# Patient Record
Sex: MALE | Race: ASIAN | NOT HISPANIC OR LATINO | Employment: FULL TIME | URBAN - METROPOLITAN AREA
[De-identification: names, ages, dates, MRNs, and addresses within clinical notes are randomized per-mention and may not be internally consistent; named-entity substitution may affect disease eponyms.]

---

## 2019-04-19 ENCOUNTER — HOSPITAL ENCOUNTER (INPATIENT)
Facility: CLINIC | Age: 49
LOS: 1 days | Discharge: HOME | End: 2019-04-20
Attending: EMERGENCY MEDICINE | Admitting: STUDENT IN AN ORGANIZED HEALTH CARE EDUCATION/TRAINING PROGRAM
Payer: COMMERCIAL

## 2019-04-19 ENCOUNTER — APPOINTMENT (OUTPATIENT)
Dept: CT IMAGING | Facility: CLINIC | Age: 49
End: 2019-04-19
Attending: EMERGENCY MEDICINE
Payer: COMMERCIAL

## 2019-04-19 ENCOUNTER — OFFICE VISIT (OUTPATIENT)
Dept: URGENT CARE | Facility: URGENT CARE | Age: 49
End: 2019-04-19
Payer: COMMERCIAL

## 2019-04-19 VITALS
HEART RATE: 84 BPM | OXYGEN SATURATION: 96 % | TEMPERATURE: 97 F | RESPIRATION RATE: 20 BRPM | DIASTOLIC BLOOD PRESSURE: 83 MMHG | SYSTOLIC BLOOD PRESSURE: 131 MMHG

## 2019-04-19 DIAGNOSIS — R47.81 SLURRED SPEECH: ICD-10-CM

## 2019-04-19 DIAGNOSIS — E78.5 HYPERLIPIDEMIA LDL GOAL <130: Primary | ICD-10-CM

## 2019-04-19 DIAGNOSIS — R20.0 LEFT ARM NUMBNESS: Primary | ICD-10-CM

## 2019-04-19 DIAGNOSIS — F17.200 TOBACCO USE DISORDER: ICD-10-CM

## 2019-04-19 DIAGNOSIS — I63.9 CEREBROVASCULAR ACCIDENT (CVA), UNSPECIFIED MECHANISM (H): ICD-10-CM

## 2019-04-19 LAB
ANION GAP SERPL CALCULATED.3IONS-SCNC: 9 MMOL/L (ref 3–14)
APTT PPP: 35 SEC (ref 22–37)
BASOPHILS # BLD AUTO: 0 10E9/L (ref 0–0.2)
BASOPHILS NFR BLD AUTO: 0.2 %
BUN SERPL-MCNC: 18 MG/DL (ref 7–30)
CALCIUM SERPL-MCNC: 8.9 MG/DL (ref 8.5–10.1)
CHLORIDE SERPL-SCNC: 111 MMOL/L (ref 94–109)
CO2 SERPL-SCNC: 22 MMOL/L (ref 20–32)
CREAT SERPL-MCNC: 0.76 MG/DL (ref 0.66–1.25)
DIFFERENTIAL METHOD BLD: NORMAL
EOSINOPHIL # BLD AUTO: 0.1 10E9/L (ref 0–0.7)
EOSINOPHIL NFR BLD AUTO: 1.5 %
ERYTHROCYTE [DISTWIDTH] IN BLOOD BY AUTOMATED COUNT: 12.5 % (ref 10–15)
GFR SERPL CREATININE-BSD FRML MDRD: >90 ML/MIN/{1.73_M2}
GLUCOSE BLDC GLUCOMTR-MCNC: 99 MG/DL (ref 70–99)
GLUCOSE SERPL-MCNC: 117 MG/DL (ref 70–99)
HCT VFR BLD AUTO: 41.3 % (ref 40–53)
HGB BLD-MCNC: 14.2 G/DL (ref 13.3–17.7)
IMM GRANULOCYTES # BLD: 0 10E9/L (ref 0–0.4)
IMM GRANULOCYTES NFR BLD: 0.3 %
INR PPP: 0.97 (ref 0.86–1.14)
INTERPRETATION ECG - MUSE: NORMAL
LYMPHOCYTES # BLD AUTO: 2 10E9/L (ref 0.8–5.3)
LYMPHOCYTES NFR BLD AUTO: 22.4 %
MCH RBC QN AUTO: 30.8 PG (ref 26.5–33)
MCHC RBC AUTO-ENTMCNC: 34.4 G/DL (ref 31.5–36.5)
MCV RBC AUTO: 90 FL (ref 78–100)
MONOCYTES # BLD AUTO: 0.5 10E9/L (ref 0–1.3)
MONOCYTES NFR BLD AUTO: 6 %
NEUTROPHILS # BLD AUTO: 6.1 10E9/L (ref 1.6–8.3)
NEUTROPHILS NFR BLD AUTO: 69.6 %
NRBC # BLD AUTO: 0 10*3/UL
NRBC BLD AUTO-RTO: 0 /100
PLATELET # BLD AUTO: 229 10E9/L (ref 150–450)
POTASSIUM SERPL-SCNC: 3.8 MMOL/L (ref 3.4–5.3)
RBC # BLD AUTO: 4.61 10E12/L (ref 4.4–5.9)
SODIUM SERPL-SCNC: 142 MMOL/L (ref 133–144)
WBC # BLD AUTO: 8.7 10E9/L (ref 4–11)

## 2019-04-19 PROCEDURE — 25800030 ZZH RX IP 258 OP 636: Performed by: STUDENT IN AN ORGANIZED HEALTH CARE EDUCATION/TRAINING PROGRAM

## 2019-04-19 PROCEDURE — 85610 PROTHROMBIN TIME: CPT | Performed by: EMERGENCY MEDICINE

## 2019-04-19 PROCEDURE — 99285 EMERGENCY DEPT VISIT HI MDM: CPT | Mod: 25

## 2019-04-19 PROCEDURE — 25000125 ZZHC RX 250: Performed by: EMERGENCY MEDICINE

## 2019-04-19 PROCEDURE — 80048 BASIC METABOLIC PNL TOTAL CA: CPT | Performed by: EMERGENCY MEDICINE

## 2019-04-19 PROCEDURE — 93000 ELECTROCARDIOGRAM COMPLETE: CPT | Performed by: FAMILY MEDICINE

## 2019-04-19 PROCEDURE — 00000146 ZZHCL STATISTIC GLUCOSE BY METER IP

## 2019-04-19 PROCEDURE — 25000132 ZZH RX MED GY IP 250 OP 250 PS 637: Performed by: EMERGENCY MEDICINE

## 2019-04-19 PROCEDURE — 25000128 H RX IP 250 OP 636: Performed by: EMERGENCY MEDICINE

## 2019-04-19 PROCEDURE — 99207 ZZC OFFICE-HOSPITAL ADMIT: CPT | Performed by: FAMILY MEDICINE

## 2019-04-19 PROCEDURE — 70498 CT ANGIOGRAPHY NECK: CPT

## 2019-04-19 PROCEDURE — 85025 COMPLETE CBC W/AUTO DIFF WBC: CPT | Performed by: EMERGENCY MEDICINE

## 2019-04-19 PROCEDURE — 12000000 ZZH R&B MED SURG/OB

## 2019-04-19 PROCEDURE — 93005 ELECTROCARDIOGRAM TRACING: CPT

## 2019-04-19 PROCEDURE — 99291 CRITICAL CARE FIRST HOUR: CPT | Mod: GC | Performed by: PSYCHIATRY & NEUROLOGY

## 2019-04-19 PROCEDURE — 85730 THROMBOPLASTIN TIME PARTIAL: CPT | Performed by: EMERGENCY MEDICINE

## 2019-04-19 PROCEDURE — 70450 CT HEAD/BRAIN W/O DYE: CPT

## 2019-04-19 PROCEDURE — 99223 1ST HOSP IP/OBS HIGH 75: CPT | Mod: AI | Performed by: STUDENT IN AN ORGANIZED HEALTH CARE EDUCATION/TRAINING PROGRAM

## 2019-04-19 RX ORDER — ATORVASTATIN CALCIUM 40 MG/1
40 TABLET, FILM COATED ORAL
Status: DISCONTINUED | OUTPATIENT
Start: 2019-04-20 | End: 2019-04-20 | Stop reason: HOSPADM

## 2019-04-19 RX ORDER — SODIUM CHLORIDE 9 MG/ML
INJECTION, SOLUTION INTRAVENOUS CONTINUOUS
Status: DISCONTINUED | OUTPATIENT
Start: 2019-04-19 | End: 2019-04-20 | Stop reason: HOSPADM

## 2019-04-19 RX ORDER — ASPIRIN 300 MG/1
300 SUPPOSITORY RECTAL ONCE
Status: COMPLETED | OUTPATIENT
Start: 2019-04-19 | End: 2019-04-19

## 2019-04-19 RX ORDER — ONDANSETRON 2 MG/ML
4 INJECTION INTRAMUSCULAR; INTRAVENOUS EVERY 6 HOURS PRN
Status: DISCONTINUED | OUTPATIENT
Start: 2019-04-19 | End: 2019-04-20 | Stop reason: HOSPADM

## 2019-04-19 RX ORDER — HYDRALAZINE HYDROCHLORIDE 20 MG/ML
10 INJECTION INTRAMUSCULAR; INTRAVENOUS EVERY 4 HOURS PRN
Status: DISCONTINUED | OUTPATIENT
Start: 2019-04-19 | End: 2019-04-20 | Stop reason: HOSPADM

## 2019-04-19 RX ORDER — ASPIRIN 81 MG/1
81 TABLET, CHEWABLE ORAL DAILY
Status: DISCONTINUED | OUTPATIENT
Start: 2019-04-20 | End: 2019-04-20 | Stop reason: HOSPADM

## 2019-04-19 RX ORDER — SODIUM CHLORIDE 9 MG/ML
INJECTION, SOLUTION INTRAVENOUS CONTINUOUS
Status: DISCONTINUED | OUTPATIENT
Start: 2019-04-19 | End: 2019-04-20

## 2019-04-19 RX ORDER — ONDANSETRON 4 MG/1
4 TABLET, ORALLY DISINTEGRATING ORAL EVERY 6 HOURS PRN
Status: DISCONTINUED | OUTPATIENT
Start: 2019-04-19 | End: 2019-04-20 | Stop reason: HOSPADM

## 2019-04-19 RX ORDER — NALOXONE HYDROCHLORIDE 0.4 MG/ML
.1-.4 INJECTION, SOLUTION INTRAMUSCULAR; INTRAVENOUS; SUBCUTANEOUS
Status: DISCONTINUED | OUTPATIENT
Start: 2019-04-19 | End: 2019-04-20 | Stop reason: HOSPADM

## 2019-04-19 RX ORDER — IOPAMIDOL 755 MG/ML
120 INJECTION, SOLUTION INTRAVASCULAR ONCE
Status: COMPLETED | OUTPATIENT
Start: 2019-04-19 | End: 2019-04-19

## 2019-04-19 RX ADMIN — ASPIRIN 300 MG: 300 SUPPOSITORY RECTAL at 16:26

## 2019-04-19 RX ADMIN — SODIUM CHLORIDE: 9 INJECTION, SOLUTION INTRAVENOUS at 17:49

## 2019-04-19 RX ADMIN — IOPAMIDOL 70 ML: 755 INJECTION, SOLUTION INTRAVENOUS at 15:00

## 2019-04-19 RX ADMIN — SODIUM CHLORIDE 100 ML: 9 INJECTION, SOLUTION INTRAVENOUS at 15:00

## 2019-04-19 ASSESSMENT — ENCOUNTER SYMPTOMS
FACIAL ASYMMETRY: 1
SHORTNESS OF BREATH: 0
COUGH: 0
WEAKNESS: 1
ABDOMINAL PAIN: 0
HEADACHES: 0
SPEECH DIFFICULTY: 1
NAUSEA: 0
NUMBNESS: 1
FEVER: 0
VOMITING: 0
DIARRHEA: 0
SORE THROAT: 0

## 2019-04-19 ASSESSMENT — MIFFLIN-ST. JEOR: SCORE: 1465.47

## 2019-04-19 ASSESSMENT — ACTIVITIES OF DAILY LIVING (ADL): ADLS_ACUITY_SCORE: 10

## 2019-04-19 NOTE — PLAN OF CARE
Pt transferred from ED with L facial droop, slurred speech, decreased sensaion L side of face and CHAZ and LLEs slightly weaker than R. VSS. Alert and oriented x4. NPO until SLP eval tomorrow. Ambulating with SBA. Plan for echo and MRI tomorrow.

## 2019-04-19 NOTE — PHARMACY-ADMISSION MEDICATION HISTORY
Admission medication history interview status for the 4/19/2019  admission is complete. See EPIC admission navigator for prior to admission medications     Medication history source reliability:Good    Actions taken by pharmacist (provider contacted, etc):None     Additional medication history information not noted on PTA med list :None    Medication reconciliation/reorder completed by provider prior to medication history? No    Time spent in this activity: 3 min    Prior to Admission medications    Not on File

## 2019-04-19 NOTE — CONSULTS
[facility]      Stroke Consult Note    Reason for Consult:  Stroke Code    HPI  Arsenio Pavon is a 48 year old man who went to bed asymptomatic but woke up with left face, arm and leg weakness.  He works as a  and tried to work today but noted his left side was moving slower. He has no prior neurologic history, does not see any doctor, and is a smoker. BP on arrival to the ED was 131/83.    Head CT was unremarkable and CTA head/neck showed:                                                                IMPRESSION:   1. CTA head: Moderate short segment stenosis involving the  supraclinoid left internal carotid artery.  2. CTA neck: Unremarkable CTA of the neck. No evidence of  flow-limiting stenosis at the carotid bifurcations.    TPA Treatment   Not given due to outside the time window.    Endovascular Treatment  Not initiated due to absence of proximal vessel occlusion    Impression  Ischemic Stroke due to undetermined etiology     Recommendations  Acute Ischemic Stroke (without tPA) Recommendations  - Neurochecks  - Permissive HTN; labetalol PRN for SBP > 220  - Euthermia, Euglycemia  - Daily aspirin for secondary stroke prevention  - Statin  - MRI/MRA Stroke Protocol  - TTE with Bubble Study  - Telemetry, EKG  - Bedside Glucose Monitoring  - A1c, Lipid Panel, Troponin x 3  - PT/OT/SLP  - PM&R  - Stroke Education    Patient Follow-up    - final recommendation pending work-up      Please contact the Stroke Service with any questions.    Brissa Jorge PA-C  Neurology  04/19/2019 5:07 PM  Pager: [phone]      Text Page ([phone])  __________________________________________________    Past Medical History:   Diagnosis Date     Smoking        No past surgical history on file.    Medications   Home Meds  Prior to Admission medications    Not on File       Scheduled meds    [START ON 4/20/2019] aspirin  81 mg Oral Daily     [START ON 4/20/2019] atorvastatin  40 mg Oral or NG Tube Daily at 8 pm        Infusion meds    - MEDICATION INSTRUCTIONS -       sodium chloride       sodium chloride         PRN meds  hydrALAZINE, - MEDICATION INSTRUCTIONS -, melatonin, naloxone, ondansetron **OR** ondansetron      Allergies   No Known Allergies    Family History   Family History     Problem (# of Occurrences) Relation (Name,Age of Onset)    Cerebrovascular Disease (1) Father    Colon Cancer (1) Father (64)    Diabetes (1) Father    Hypertension (3) Father, Mother, Brother          Social History      Social History     Tobacco Use     Smoking status: Current Every Day Smoker     Packs/day: 0.25     Types: Cigarettes   Substance Use Topics     Alcohol use: No     Drug use: No         ROS  The 5 point Review of Systems is negative other than noted in the HPI or here.     PHYSICAL EXAMINATION  Temp:  [97  F (36.1  C)-98.6  F (37  C)] 97.6  F (36.4  C)  Pulse:  [63-84] 63  Heart Rate:  [63-86] 76  Resp:  [15-22] 20  BP: (111-131)/(78-84) 113/81  SpO2:  [96 %-97 %] 96 %        Stroke Scales    NIHSS  Interval and Comments baseline   1a. Level of Consciousness 0-->Alert, keenly responsive   1b. LOC Questions 0-->Answers both questions correctly   1c. LOC Commands 0-->Performs both tasks correctly   2.   Best Gaze 0-->Normal   3.   Visual 0-->No visual loss   4.   Facial Palsy 1-->Minor paralysis (flattened nasolabial fold, asymmetry on smiling)   5a. Motor Arm, Left 1-->Drift, limb holds 90 (or 45) degrees, but drifts down before full 10 seconds, does not hit bed or other support   5b. Motor Arm, Right 0-->No drift, limb holds 90 (or 45) degrees for full 10 secs   6a. Motor Leg, Left 0-->No drift, leg holds 30 degree position for full 5 secs   6b. Motor Leg, right 0-->No drift, leg holds 30 degree position for full 5 secs   7.   Limb Ataxia 0-->Absent   8.   Sensory 1-->Mild-to-moderate sensory loss, patient feels pinprick is less sharp or is dull on the affected side, or there is a loss of superficial pain with pinprick,  but patient is aware of being touched   9.   Best Language 0-->No aphasia, normal   10. Dysarthria 0-->Normal   11. Extinction and Inattention  0-->No abnormality   Total 3       Labs/Imaging  Labs and imaging were reviewed and used in developing plan; pertinent results included.  Data   CBC  WBC (10e9/L)   Date Value   04/19/2019 8.7    RBC Count (10e12/L)   Date Value   04/19/2019 4.61    Hemoglobin (g/dL)   Date Value   04/19/2019 14.2      Hematocrit (%)   Date Value   04/19/2019 41.3    Platelet Count (10e9/L)   Date Value   04/19/2019 229         BMP  Sodium (mmol/L)   Date Value   04/19/2019 142    Potassium (mmol/L)   Date Value   04/19/2019 3.8    Chloride (mmol/L)   Date Value   04/19/2019 111 (H)      Carbon Dioxide (mmol/L)   Date Value   04/19/2019 22    Glucose (mg/dL)   Date Value   04/19/2019 117 (H)   12/03/2015 106 (H)    Urea Nitrogen (mg/dL)   Date Value   04/19/2019 18      Creatinine (mg/dL)   Date Value   04/19/2019 0.76    Calcium (mg/dL)   Date Value   04/19/2019 8.9         INR Troponin I A1C   INR (no units)   Date Value   04/19/2019 0.97    No results found for: TROPI No results found for: A1C     Liver Panel  No results found for: PROTTOTAL No results found for: ALBUMIN No results found for: BILITOTAL   No results found for: ALKPHOS No results found for: AST No results found for: ALT   No results found for: BILIDIRECT       Lipid Profile  Cholesterol (mg/dL)   Date Value   12/03/2015 224 (H)    HDL Cholesterol (mg/dL)   Date Value   12/03/2015 38 (L)    LDL Cholesterol Calculated (mg/dL)   Date Value   12/03/2015 145 (H)      Triglycerides (mg/dL)   Date Value   12/03/2015 204 (H)    No results found for: CHOLHDLRATIO           I have personally spent a total of 40 minutes providing critical care services supervising this patient's stroke code activation.  I personally reviewed all lab values and radiology images.  I evaluated and directed care for the patient's critical condition.      Stroke Code Data  (for stroke code without tele)  Stroke code activated 04/19/19   1454   First stroke provider response 04/19/19   1456   Last known normal 04/18/19   2200   Time of discovery   (or onset of symptoms) 04/19/19   0730   Head CT read by me 04/19/19   1508   Was stroke code de-escalated? Yes 04/19/19 1514  patient is outside emergent treatment time parameters

## 2019-04-19 NOTE — ED TRIAGE NOTES
Per brother Pt  complained of left arm weakness along with slurred speech at 7 in the morning when he woke up from sleeping.  Patient felt like he was dropping things from his hand.  Also has been noticing some slurred speech.

## 2019-04-19 NOTE — ED PROVIDER NOTES
History     Chief Complaint:  Numbness    HPI   Arsenio Pavon is a 48 year old male with a history of hyperlipidemia who presents with his son to the ED for evaluation of numbness. The patient reports feeling well going to bed last night, however, upon waking up at 0730 this morning he noticed left facial numbness and difficulty speaking. He ended up still going to work; he works as a  in a restaurant. He was not able to perform his work normally was he did not have dexterity in his left hand, his dominant side. Due to continuing symptoms, his son brought him to urgent care where they immediately sent him here. Here, the patient reports left face, tongue, and mouth numbness, left facial droop, left arm/hand numbness and weakness, and decreased  strength and dexterity of the left hand. His son reports that he has been dropping things today, which is very unusual. Patient feels like he cannot speak fluently and is talking much slower than normal. He denies any left leg numbness but does note some weakness. He denies any vision problems or watering of the eyes. He has no history of stroke or bells palsy. He does not have a primary care provider and has not been to a doctor in quite some time. Patient denies any chest pain, cough, shortness of breath, abdominal pain, nausea, vomiting, diarrhea, headache, vision disturbance, fever, sore throat, or any other symptoms in the past week. Patient is not anticoagulated.     Allergies:  No known drug allergies    Medications:    The patient is not currently taking any prescribed medications.    Past Medical History:    Eczema  Impaired fasting glucose  Hyperlipidemia  Tobacco use disorder    Past Surgical History:    History reviewed. No pertinent surgical history.    Family History:    Colon cancer  Diabetes  Hypertension    Social History:  Smoking status: Current every day smoker  Alcohol use: No  Marital Status:       Review of Systems   Constitutional:  "Negative for fever.   HENT: Negative for sore throat.    Respiratory: Negative for cough and shortness of breath.    Cardiovascular: Negative for chest pain.   Gastrointestinal: Negative for abdominal pain, diarrhea, nausea and vomiting.   Neurological: Positive for facial asymmetry, speech difficulty, weakness and numbness. Negative for syncope and headaches.   All other systems reviewed and are negative.        Physical Exam   Patient Vitals for the past 24 hrs:   BP Temp Temp src Pulse Heart Rate Resp SpO2 Height Weight   04/19/19 1620 121/84 -- -- 79 76 22 -- -- --   04/19/19 1600 115/78 -- -- 78 75 22 -- -- --   04/19/19 1545 111/78 -- -- -- 63 15 96 % -- --   04/19/19 1515 124/78 -- -- -- 86 21 97 % -- --   04/19/19 1445 126/82 -- -- -- -- -- -- -- --   04/19/19 1442 125/79 98.6  F (37  C) Temporal 82 -- 18 97 % 1.676 m (5' 6\") 65.8 kg (145 lb)     Physical Exam  Constitutional:  Patient is oriented to person, place, and time. They appear well-developed and well-nourished. Mild distress secondary to left sided numbness and weakness.   HENT:   Mouth/Throat:   Oropharynx is clear and moist. No forehead paresis.  Eyes:    Conjunctivae normal and EOM are normal. Pupils are equal, round, and reactive to light.   Neck:    Normal range of motion.   Cardiovascular: Normal rate, regular rhythm and normal heart sounds.  Exam reveals no gallop and no friction rub.  No murmur heard.  Pulmonary/Chest:  Effort normal and breath sounds normal. Patient has no wheezes. Patient has no rales.   Abdominal:   Soft. Bowel sounds are normal. Patient exhibits no mass. There is no tenderness. There is no rebound and no guarding.   Musculoskeletal:  Normal range of motion. Patient exhibits no edema.   Neurological:   Patient is alert and oriented to person, place, and time. See NIH stroke scale. GCS 15  Skin:   Skin is warm and dry. No rash noted. No erythema.   Psychiatric:   Patient has a normal mood     National Institutes of Health " Stroke Scale  Exam Interval: Baseline   Score    Level of consciousness: (0)   Alert, keenly responsive    LOC questions: (0)   Answers both questions correctly    LOC commands: (0)   Performs both tasks correctly    Best gaze: (0)   Normal    Visual: (0)   No visual loss    Facial palsy: (2)   Partial paralysis (total/near total of lower face)    Motor arm (left): (1)   Drift    Motor arm (right): (0)   No drift    Motor leg (left): (0)   No drift    Motor leg (right): (0)   No drift    Limb ataxia: (0)   Absent    Sensory: (0)   Normal- no sensory loss    Best language: (0)   Normal- no aphasia    Dysarthria: (1)   Mild to moderate dysarthria    Extinction and inattention: (0)   No abnormality        Total Score:  4     Emergency Department Course   ECG (14:47:13):  Rate 81 bpm. AL interval 160. QRS duration 68. QT/QTc 350/406. P-R-T axes 65 46 36. Normal sinus rhythm. Possible left atrial enlargement. Borderline ECG. Interpreted at 1500 by Pau Garcia MD.    Imaging:  Radiographic findings were communicated with the patient and family who voiced understanding of the findings.    CTA Head Neck w/ contrast:  IMPRESSION:   1. CTA head: Moderate to severe atherosclerotic focal narrowing  involving the supraclinoid left internal carotid artery.  2. CTA neck: Unremarkable CTA of the neck. No evidence of  flow-limiting stenosis at the carotid bifurcations.  Preliminary result per radiology.     CT-scan Head w/o contrast:  IMPRESSION: No evidence of acute ischemia or hemorrhage.  Result per radiology.     Laboratory:  BMP: Chloride 111 (H), Glucose 117 (H) o/w WNL (Creatinine 0.76)  CBC: WNL (WBC 8.7, HGB 14.2, )  INR: 0.97  PTT: 35    Interventions:  1626: Aspirin 300 mg Rectal    Emergency Department Course:  Past medical records, nursing notes, and vitals reviewed.  1451: I performed an exam of the patient and obtained history, as documented above. GCS 15.    IV inserted and blood drawn.    1454:  Code Stroke called.     1457: I discussed the case with Brissa Jorge PA-C, of stroke neurology regarding the patient, she saw the patient just before CT.    The patient was sent for a CT while in the emergency department, findings above.    1509: I discussed the case with Dr. Allen of neurology radiology regarding the patient.    1510: Dr. Hess of stroke neurology spoke with the patient.     1516: I rechecked the patient.    1542: I rechecked the patient. Explained findings to patient and son.    Findings and plan explained to the Patient and son who consents to admission.     1546: Discussed the patient with Dr. Storey, who will admit the patient to a neuro bed for further monitoring, evaluation, and treatment.     Impression & Plan    CMS Diagnoses: The patient has stroke symptoms:           ED Stroke specific documentation           NIHSS PDF          Protocol PDF     Patient last known well time: Last night at bedtime.  ED Provider first to bedside at: 1451  CT Results received at: 1518    tPA:   Not given due to outside the time window.    If treating with tPA: Ensure SBP<185 and DBP<105 prior to treatment with IV tPA.  Administering IV tPA after treatment with IV labetalol, hydralazine, or nicardipine is reasonable once BP control is established.    Endovascular Retrieval:  Not initiated due to absence of proximal vessel occlusion    National Institutes of Health Stroke Scale (Baseline)  Time Performed: 1451      Score    Level of consciousness: (0)   Alert, keenly responsive    LOC questions: (0)   Answers both questions correctly    LOC commands: (0)   Performs both tasks correctly    Best gaze: (0)   Normal    Visual: (0)   No visual loss    Facial palsy: (2)   Partial paralysis (total/near total of lower face)    Motor arm (left): (1)   Drift    Motor arm (right): (0)   No drift    Motor leg (left): (0)   No drift    Motor leg (right): (0)   No drift    Limb ataxia: (0)   Absent    Sensory: (0)   Normal-  no sensory loss    Best language: (0)   Normal- no aphasia    Dysarthria: (1)   Mild to moderate dysarthria    Extinction and inattention: (0)   No abnormality        Total Score:  4     Stroke Mimics were considered (including migraine headache, seizure disorder, hypoglycemia (or hyperglycemia), head or spinal trauma, CNS infection, Toxin ingestion and shock state (e.g. sepsis) .        Medical Decision Making:  Arsenio Pavon is a 48 year old male who was brought in for symptoms concerning for stroke. The onset of last normal time was last night when he went to bed. On his examination here, he did have left sided facial droop and left upper extremity weakness. No alterations in sensation to his face. Diminished  strength in the left hand with slight drift. I did call a code stroke as this technically was a wake up stroke. He went to CT immediately. There is no mass, clot, bleeding, or other abnormalities. Neuro stroke came down to assess him. He is not a TPA candidate as he is too far outside the window. He is not on any anticoagulants and frankly doesn't take any medications at all. I wrote him for a rectal aspirin. His EKG does not show any evidence of a-fib. He is not significantly hypertensive. At this point, I will admit him to a neuro bed for further workup.    Critical Care time:  was 35 minutes for this patient excluding procedures.    Diagnosis:    ICD-10-CM   1. Cerebrovascular accident (CVA), unspecified mechanism (H) I63.9       Disposition:  Admitted to Dr. Storey on the neuro floor.      Lynda Betancur  4/19/2019    EMERGENCY DEPARTMENT  I, Lynda Betancur, am serving as a scribe at 2:51 PM on 4/19/2019 to document services personally performed by Pau Garcia MD based on my observations and the provider's statements to me.        Pau Garcia MD  04/19/19 [id number]

## 2019-04-19 NOTE — H&P
[facility]    History and Physical - Hospitalist Service       Date of Admission:  4/19/2019    Assessment & Plan   Arsenio Pavon is a 48 year old male admitted on 4/19/2019. He presents with left upper and lower extremity weakness and facial droop.     CVA  Assessment: presents with left upper and lower extremity weakness with slurred speech and left facial droop, consistent with CVA. CTA shows mdoderate short segment stenosis involving the  left internal carotid artery. Unremarkable CTA of the neck.   Plan  - admit to inpatient  - Neurology consult  - IVF  - permissive hypertension up to . Labetalol as needed.   - PT/OT/SLP  - Telemetry  - ASA daily  - Start Lipitor 40 mg  - Lipid panel/A1c/trop in AM  - ECHO  - ASA daily    Tobacco Abuse  Assessment/Plan: strongly encouraged tobacco cessation. Patient seemed mildly motivated.       Diet: Regular diet if passes swallow eval  DVT Prophylaxis: Pneumatic Compression Devices  Maharaj Catheter: not present  Code Status: FULL CODE    Disposition Plan   Expected discharge: 2 - 3 days, recommended to prior living arrangement once Stroke work-up completed.  Entered: Jones Storey MD 04/19/2019, 4:04 PM     The patient's care was discussed with the Patient, Patient's Family and ED Provider.    Jones Storey MD  [facility]    ______________________________________________________________________    Chief Complaint     Facial Droop  Left Upper and Lower extremity weakness    History is obtained from the patient    History of Present Illness   Arsenio Pvaon is a 48 year old male with PMH of tobacco abuse, family history of coronary artery disease who presents for evaluation of left arm weakness, slurred speech, facial droop.    Patient reports that prior to going to bed last that he felt that his left leg was weaker than baseline.  Other than that he otherwise went to bed in his usual state of health.  He then woke up around 7 AM on the  morning of admission, and he noticed that he had left arm weakness and his speech was slurred.  He denied any chest pain/shortness of breath or palpitations.  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  He was at work where his coworkers and brother noticed that he had a facial droop, and slurred speech in addition to weakness.  At which point they strongly urged that he be evaluated in urgent care in [place].  At urgent care they noticed that he also had left facial numbness, and thus recommended that he be evaluated in the ED for a CVA.  He reports family history and his dad who had a stroke and heart disease.  Otherwise no other significant family history.  He is a daily tobacco smoker, and had no intention of quitting prior to today.  He had not seen a doctor for years, he denies any history of hypertension or hyperlipidemia.  He denies any loss of bowel or bladder function, no diplopia, no headaches, no nausea/vomiting or abdominal pain.  He denies any recent fevers or chills, no recent blood in his stool, weight loss or night sweats.  He denies any urinary complaints.  He otherwise has no other complaints this time.    Review of Systems    The 10 point Review of Systems is negative other than noted in the HPI or here.     Past Medical History    I have reviewed this patient's medical history and updated it with pertinent information if needed.   Past Medical History:   Diagnosis Date     Smoking        Past Surgical History   I have reviewed this patient's surgical history and updated it with pertinent information if needed.  No past surgical history on file.    Social History   I have reviewed this patient's social history and updated it with pertinent information if needed.  Social History     Tobacco Use     Smoking status: Current Every Day Smoker     Packs/day: 0.25     Types: Cigarettes    Substance Use Topics     Alcohol use: No     Drug use: No       Family History   I have reviewed this patient's family history and updated it with pertinent information if needed.   Family History   Problem Relation Age of Onset     Colon Cancer Father 64     Diabetes Father      Hypertension Father      Cerebrovascular Disease Father      Hypertension Mother      Hypertension Brother      Prior to Admission Medications   None     Allergies   No Known Allergies    Physical Exam   Vital Signs: Temp: 98.6  F (37  C) Temp src: Temporal BP: 124/78 Pulse: 82 Heart Rate: 86 Resp: 21 SpO2: 97 % O2 Device: None (Room air)    Weight: 145 lbs 0 oz    Constitutional: awake, alert, cooperative, no apparent distress, and appears stated age  Eyes: Lids and lashes normal, pupils equal, round and reactive to light, extra ocular muscles intact, sclera clear, conjunctiva normal  ENT: Normocephalic, without obvious abnormality, atraumatic, sinuses nontender on palpation, external ears without lesions, oral pharynx with moist mucous membranes, tonsils without erythema or exudates, gums normal and good dentition.  Hematologic / Lymphatic: no cervical lymphadenopathy  Respiratory: CTABL  Cardiovascular: RRR with no m/r/g  GI: NT, ND, BS+  Skin: normal skin color, texture, turgor  Musculoskeletal: There is no redness, warmth, or swelling of the joints.  Full range of motion noted.  Motor strength is 5 out of 5 all extremities bilaterally.  Tone is normal.  Neurologic: Awake, alert, oriented to name, place and time.  Cranial nerves II-XII are grossly intact.  Motor is 4/5 on left upper and lower extremity.  Cerebellar finger to nose, heel to shin intact.  Sensory decreased in left face.  Babinski down going, Romberg negative, and gait was not assessed. Left facial droop present. Speech slurred mildly.   Neuropsychiatric: normal mood and affect    Data   Data reviewed today: I reviewed all medications, new labs and imaging results over  the last 24 hours. I personally reviewed the CTA  image(s) showing see below.    IMPRESSION:   1. CTA head: Moderate short segment stenosis involving the  supraclinoid left internal carotid artery.  2. CTA neck: Unremarkable CTA of the neck. No evidence of  flow-limiting stenosis at the carotid bifurcations.    EKG      Most Recent 3 CBC's:  Recent Labs   Lab Test 04/19/19  1454   WBC 8.7   HGB 14.2   MCV 90        Most Recent 3 BMP's:  Recent Labs   Lab Test 04/19/19  1454 12/03/15  0953     --    POTASSIUM 3.8  --    CHLORIDE 111*  --    CO2 22  --    BUN 18  --    CR 0.76  --    ANIONGAP 9  --    SILVERIO 8.9  --    * 106*     Most Recent 2 LFT's:No lab results found.  Most Recent 3 INR's:  Recent Labs   Lab Test 04/19/19  1454   INR 0.97     Recent Results (from the past 24 hour(s))   CT Head w/o Contrast    Narrative    CT SCAN OF THE HEAD WITHOUT CONTRAST   4/19/2019 3:05 PM     HISTORY: Numbness and speech difficulty.    TECHNIQUE:  Axial images of the head and coronal reformations without  IV contrast material. Radiation dose for this scan was reduced using  automated exposure control, adjustment of the mA and/or kV according  to patient size, or iterative reconstruction technique.    COMPARISON: None.    FINDINGS:  The cerebral hemispheres, brainstem, and cerebellum  demonstrate normal morphology and attenuation. No evidence of acute  ischemia, hemorrhage, mass, mass effect, or hydrocephalus. The  visualized calvarium, skull base, paranasal sinuses, and extracranial  soft tissues are unremarkable.      Impression    IMPRESSION: No evidence of acute ischemia or hemorrhage.    Findings were discussed by phone between Dr. Allen and Dr. Garcia  at 3:09 PM on 4/19/2019.    JAZMÍN ALLEN MD   CTA Head Neck with Contrast    Narrative    CT ANGIOGRAM OF THE HEAD AND NECK WITH CONTRAST  4/19/2019 3:06 PM     HISTORY: Numbness and speech difficulty.    TECHNIQUE:  CT angiography with an injection  of 70 mL Isovue-370 IV  with scans through the head and neck. Images were transferred to a  separate 3-D workstation where multiplanar reformations and 3-D images  were created. Estimates of carotid stenoses are made relative to the  distal internal carotid artery diameters except as noted. Radiation  dose for this scan was reduced using automated exposure control,  adjustment of the mA and/or kV according to patient size, or iterative  reconstruction technique.    COMPARISON: None.     CT HEAD FINDINGS: No contrast enhancing lesions are identified.    CT ANGIOGRAM HEAD FINDINGS:  The vertebral arteries, basilar artery,  and posterior cerebral arteries are patent. The internal carotid  arteries, anterior cerebral arteries, and middle cerebral arteries are  patent. Left A1 segment is hypoplastic. There is severe  atherosclerotic plaquing involving the supraclinoid left internal  carotid artery where there is a moderate short segment stenosis. No  aneurysms are identified. Dural venous sinuses are without appreciable  abnormality. Right transverse sinus is dominant. Gaze is deviated to  the right.    CT ANGIOGRAM NECK FINDINGS:  The bilateral common carotid, internal  carotid, external carotid, and vertebral arteries are patent. No  evidence of flow-limiting stenosis at the carotid bifurcations. Left  vertebral artery is dominant. Bilateral vertebral artery origins are  widely patent.     Mild degenerative changes are present throughout the cervical spine.       Impression    IMPRESSION:   1. CTA head: Moderate short segment stenosis involving the  supraclinoid left internal carotid artery.  2. CTA neck: Unremarkable CTA of the neck. No evidence of  flow-limiting stenosis at the carotid bifurcations.    JAZMÍN HINES MD

## 2019-04-19 NOTE — PROGRESS NOTES
Chief Complaint   Patient presents with     Numbness     numbness in left arm since this morning along with weakness     Slurred Speech     since this morning     SUBJECTIVE:   Arsenio Pavon is a 48 year old male with family history of heart disease, stroke, patient has history of smoking, hyperlipidemia, impaired fasting glucose  presenting with a chief complaint of left arm weakness along with slurred speech.  This started at 7 in the morning when he woke up from sleep.  Patient felt like he was dropping things from his hand.  Also has been noticing some slurred speech.  Denies any weakness anywhere else has no chest pain shortness of breath chest heaviness symptoms.  He is in here with his brother who is interpreting for him.  Patient denies any worsening headache or any dizzy symptoms.   He is an established patient of [facility].  Onset of symptoms was 7 hour(s) ago.he realized when he dropped a cigarette from his hand and did not realize it   Course of illness is improving.  He described that his left  Arm and left forearm and left shoulder area felt heavy at the same time felt too weak, patient also noticed diminished strength of his left hand.  And also noticed that his speech was slurred  Severity moderate  Current and Associated symptoms: left arm weakness  Treatment measures tried include None tried.  Predisposing factors include None.    History reviewed. No pertinent past medical history.  Current Outpatient Medications   Medication Sig Dispense Refill     cyclobenzaprine (FLEXERIL) 10 MG tablet Take 0.5-1 tablets (5-10 mg) by mouth 3 times daily as needed for muscle spasms 30 tablet 0     triamcinolone (KENALOG) 0.1 % cream Apply sparingly to affected area three times daily as needed 80 g 3     Social History     Tobacco Use     Smoking status: Current Every Day Smoker     Packs/day: 0.25     Types: Cigarettes   Substance Use Topics     Alcohol use: No     Family History   Problem Relation Age of Onset      Colon Cancer Father 64     Diabetes Father      Hypertension Father      Hypertension Mother      Hypertension Brother          ROS:    10 point ROS of systems including Constitutional, Eyes, Respiratory, Cardiovascular, Gastroenterology, Genitourinary, Integumentary,, Psychiatric were all negative except for pertinent positives noted in my HPI         OBJECTIVE:  /83 (BP Location: Left arm, Patient Position: Sitting, Cuff Size: Adult Regular)   Pulse 84   Temp 97  F (36.1  C) (Oral)   Resp 20   SpO2 96%   GENERAL APPEARANCE: healthy, alert and no distress  EYES: EOMI,  PERRL, conjunctiva clear  HENT: ear canals and TM's normal.  Nose and mouth without ulcers,angle of mouth deviated to the rt , speech slurred   NECK: supple, nontender, no lymphadenopathy  RESP: lungs clear to auscultation - no rales, rhonchi or wheezes  CV: regular rates and rhythm, normal S1 S2, no murmur noted  ABDOMEN:  soft, nontender, no HSM or masses and bowel sounds normal  NEURO:strength diminished on the left upper extremity   Finger nose test intact , speech slurred   PSYCH: mentation appears normal  Physical Exam      X-Ray was not done.    Medical Decision Making:    Differential Diagnosis:  Possible stroke /CVA /TIA      ASSESSMENT:  Arsenio was seen today for numbness and slurred speech.    Diagnoses and all orders for this visit:    Left arm numbness    Slurred speech    Other orders  -     EKG 12-lead complete w/read - Clinics          PLAN:  Was advised to go to ER immediately   They decided to  go in private vehicle   Brother took pt to ER immediately ,  Pt would need CT for rule out CVA     Marion Lomax MD

## 2019-04-19 NOTE — ED NOTES
"[facility]  ED Nurse Handoff Report    ED Chief complaint: Numbness      ED Diagnosis:   Final diagnoses:   Cerebrovascular accident (CVA), unspecified mechanism (H)       Code Status: Full Code in ED, to be addressed by admitting provider    Allergies: No Known Allergies    Activity level - Baseline/Home:  Independent    Activity Level - Current:   Stand with Assist     Needed?: No    Isolation: No  Infection: Not Applicable  Bariatric?: No    Vital Signs:   Vitals:    04/19/19 1442 04/19/19 1445 04/19/19 1515   BP: 125/79 126/82 124/78   Pulse: 82     Resp: 18  21   Temp: 98.6  F (37  C)     TempSrc: Temporal     SpO2: 97%  97%   Weight: 65.8 kg (145 lb)     Height: 1.676 m (5' 6\")         Cardiac Rhythm: ,   Cardiac  Cardiac Rhythm: Normal sinus rhythm    Pain level:      Is this patient confused?: No   Does this patient have a guardian?  No         If yes, is there guardianship documents in the Epic \"Code/ACP\" activity?  N/A         Guardian Notified?  N/A  [facility] - Suicide Severity Rating Scale Completed?  Yes  If yes, what color did the patient score?  N/A    Patient Report: Initial Complaint: numbness  Focused Assessment: Pt woke up with L facial numbness, L facial droop and L hand weakness at 0730 this AM. Neuro exam exhibits the above findings. Cardiac and Respiratory exams WNL. Code Stroke initiated and de-escalated at 1513. Plan to admit for further eval 2/2 CT findings.  Tests Performed: labs, EKG, CT head/neck  Abnormal Results: CT head/neck  Treatments provided:  mg suppository    Family Comments: brother at bedside    OBS brochure/video discussed/provided to patient/family: N/A              Name of person given brochure if not patient: NA              Relationship to patient: NA    ED Medications:   Medications   aspirin (ASA) Suppository 300 mg (has no administration in time range)   iopamidol (ISOVUE-370) solution 120 mL (70 mLs Intravenous Given 4/19/19 1500) "   100mL Saline Flush (100 mLs Intravenous Given 4/19/19 1500)       Drips infusing?:  No    For the majority of the shift this patient was Green.   Interventions performed were NA.    Severe Sepsis OR Septic Shock Diagnosis Present: No    To be done/followed up on inpatient unit:  NA    ED NURSE PHONE NUMBER: [phone]

## 2019-04-20 ENCOUNTER — APPOINTMENT (OUTPATIENT)
Dept: CARDIOLOGY | Facility: CLINIC | Age: 49
End: 2019-04-20
Attending: STUDENT IN AN ORGANIZED HEALTH CARE EDUCATION/TRAINING PROGRAM
Payer: COMMERCIAL

## 2019-04-20 ENCOUNTER — APPOINTMENT (OUTPATIENT)
Dept: OCCUPATIONAL THERAPY | Facility: CLINIC | Age: 49
End: 2019-04-20
Attending: STUDENT IN AN ORGANIZED HEALTH CARE EDUCATION/TRAINING PROGRAM
Payer: COMMERCIAL

## 2019-04-20 ENCOUNTER — APPOINTMENT (OUTPATIENT)
Dept: SPEECH THERAPY | Facility: CLINIC | Age: 49
End: 2019-04-20
Attending: STUDENT IN AN ORGANIZED HEALTH CARE EDUCATION/TRAINING PROGRAM
Payer: COMMERCIAL

## 2019-04-20 ENCOUNTER — APPOINTMENT (OUTPATIENT)
Dept: MRI IMAGING | Facility: CLINIC | Age: 49
End: 2019-04-20
Attending: STUDENT IN AN ORGANIZED HEALTH CARE EDUCATION/TRAINING PROGRAM
Payer: COMMERCIAL

## 2019-04-20 ENCOUNTER — APPOINTMENT (OUTPATIENT)
Dept: PHYSICAL THERAPY | Facility: CLINIC | Age: 49
End: 2019-04-20
Attending: STUDENT IN AN ORGANIZED HEALTH CARE EDUCATION/TRAINING PROGRAM
Payer: COMMERCIAL

## 2019-04-20 VITALS
WEIGHT: 145 LBS | OXYGEN SATURATION: 96 % | SYSTOLIC BLOOD PRESSURE: 115 MMHG | HEART RATE: 63 BPM | HEIGHT: 66 IN | DIASTOLIC BLOOD PRESSURE: 69 MMHG | RESPIRATION RATE: 18 BRPM | TEMPERATURE: 98.8 F | BODY MASS INDEX: 23.3 KG/M2

## 2019-04-20 LAB
ANION GAP SERPL CALCULATED.3IONS-SCNC: 5 MMOL/L (ref 3–14)
BUN SERPL-MCNC: 13 MG/DL (ref 7–30)
CALCIUM SERPL-MCNC: 8.2 MG/DL (ref 8.5–10.1)
CHLORIDE SERPL-SCNC: 113 MMOL/L (ref 94–109)
CHOLEST SERPL-MCNC: 214 MG/DL
CO2 SERPL-SCNC: 25 MMOL/L (ref 20–32)
CREAT SERPL-MCNC: 0.8 MG/DL (ref 0.66–1.25)
ERYTHROCYTE [DISTWIDTH] IN BLOOD BY AUTOMATED COUNT: 12.4 % (ref 10–15)
GFR SERPL CREATININE-BSD FRML MDRD: >90 ML/MIN/{1.73_M2}
GLUCOSE SERPL-MCNC: 101 MG/DL (ref 70–99)
HBA1C MFR BLD: 5.5 % (ref 0–5.6)
HCT VFR BLD AUTO: 41.5 % (ref 40–53)
HDLC SERPL-MCNC: 35 MG/DL
HGB BLD-MCNC: 14.1 G/DL (ref 13.3–17.7)
LDLC SERPL CALC-MCNC: 139 MG/DL
MCH RBC QN AUTO: 30.7 PG (ref 26.5–33)
MCHC RBC AUTO-ENTMCNC: 34 G/DL (ref 31.5–36.5)
MCV RBC AUTO: 90 FL (ref 78–100)
NONHDLC SERPL-MCNC: 179 MG/DL
PLATELET # BLD AUTO: 210 10E9/L (ref 150–450)
POTASSIUM SERPL-SCNC: 4.1 MMOL/L (ref 3.4–5.3)
RBC # BLD AUTO: 4.6 10E12/L (ref 4.4–5.9)
SODIUM SERPL-SCNC: 143 MMOL/L (ref 133–144)
TRIGL SERPL-MCNC: 201 MG/DL
TROPONIN I SERPL-MCNC: <0.015 UG/L (ref 0–0.04)
WBC # BLD AUTO: 5 10E9/L (ref 4–11)

## 2019-04-20 PROCEDURE — 25500064 ZZH RX 255 OP 636: Performed by: STUDENT IN AN ORGANIZED HEALTH CARE EDUCATION/TRAINING PROGRAM

## 2019-04-20 PROCEDURE — 36415 COLL VENOUS BLD VENIPUNCTURE: CPT | Performed by: STUDENT IN AN ORGANIZED HEALTH CARE EDUCATION/TRAINING PROGRAM

## 2019-04-20 PROCEDURE — A9585 GADOBUTROL INJECTION: HCPCS | Performed by: STUDENT IN AN ORGANIZED HEALTH CARE EDUCATION/TRAINING PROGRAM

## 2019-04-20 PROCEDURE — 70553 MRI BRAIN STEM W/O & W/DYE: CPT

## 2019-04-20 PROCEDURE — 99232 SBSQ HOSP IP/OBS MODERATE 35: CPT | Performed by: PSYCHIATRY & NEUROLOGY

## 2019-04-20 PROCEDURE — 92526 ORAL FUNCTION THERAPY: CPT | Mod: GN

## 2019-04-20 PROCEDURE — 90686 IIV4 VACC NO PRSV 0.5 ML IM: CPT | Performed by: STUDENT IN AN ORGANIZED HEALTH CARE EDUCATION/TRAINING PROGRAM

## 2019-04-20 PROCEDURE — 99239 HOSP IP/OBS DSCHRG MGMT >30: CPT | Performed by: STUDENT IN AN ORGANIZED HEALTH CARE EDUCATION/TRAINING PROGRAM

## 2019-04-20 PROCEDURE — 40000264 ECHOCARDIOGRAM COMPLETE

## 2019-04-20 PROCEDURE — 83036 HEMOGLOBIN GLYCOSYLATED A1C: CPT | Performed by: STUDENT IN AN ORGANIZED HEALTH CARE EDUCATION/TRAINING PROGRAM

## 2019-04-20 PROCEDURE — 92610 EVALUATE SWALLOWING FUNCTION: CPT | Mod: GN

## 2019-04-20 PROCEDURE — 25000132 ZZH RX MED GY IP 250 OP 250 PS 637: Performed by: STUDENT IN AN ORGANIZED HEALTH CARE EDUCATION/TRAINING PROGRAM

## 2019-04-20 PROCEDURE — 97161 PT EVAL LOW COMPLEX 20 MIN: CPT | Mod: GP

## 2019-04-20 PROCEDURE — 25000132 ZZH RX MED GY IP 250 OP 250 PS 637: Performed by: PSYCHIATRY & NEUROLOGY

## 2019-04-20 PROCEDURE — 97165 OT EVAL LOW COMPLEX 30 MIN: CPT | Mod: GO | Performed by: OCCUPATIONAL THERAPIST

## 2019-04-20 PROCEDURE — 80061 LIPID PANEL: CPT | Performed by: STUDENT IN AN ORGANIZED HEALTH CARE EDUCATION/TRAINING PROGRAM

## 2019-04-20 PROCEDURE — 80048 BASIC METABOLIC PNL TOTAL CA: CPT | Performed by: STUDENT IN AN ORGANIZED HEALTH CARE EDUCATION/TRAINING PROGRAM

## 2019-04-20 PROCEDURE — 93306 TTE W/DOPPLER COMPLETE: CPT | Mod: 26 | Performed by: INTERNAL MEDICINE

## 2019-04-20 PROCEDURE — 25000128 H RX IP 250 OP 636: Performed by: STUDENT IN AN ORGANIZED HEALTH CARE EDUCATION/TRAINING PROGRAM

## 2019-04-20 PROCEDURE — 85027 COMPLETE CBC AUTOMATED: CPT | Performed by: STUDENT IN AN ORGANIZED HEALTH CARE EDUCATION/TRAINING PROGRAM

## 2019-04-20 PROCEDURE — 97110 THERAPEUTIC EXERCISES: CPT | Mod: GO | Performed by: OCCUPATIONAL THERAPIST

## 2019-04-20 PROCEDURE — 97535 SELF CARE MNGMENT TRAINING: CPT | Mod: GO | Performed by: OCCUPATIONAL THERAPIST

## 2019-04-20 PROCEDURE — 84484 ASSAY OF TROPONIN QUANT: CPT | Performed by: STUDENT IN AN ORGANIZED HEALTH CARE EDUCATION/TRAINING PROGRAM

## 2019-04-20 RX ORDER — CLOPIDOGREL BISULFATE 75 MG/1
75 TABLET ORAL DAILY
Qty: 21 TABLET | Refills: 0 | Status: SHIPPED | OUTPATIENT
Start: 2019-04-21 | End: 2019-05-15

## 2019-04-20 RX ORDER — GADOBUTROL 604.72 MG/ML
7 INJECTION INTRAVENOUS ONCE
Status: COMPLETED | OUTPATIENT
Start: 2019-04-20 | End: 2019-04-20

## 2019-04-20 RX ORDER — ASPIRIN 81 MG/1
81 TABLET, CHEWABLE ORAL DAILY
Qty: 21 TABLET | Refills: 0 | Status: SHIPPED | OUTPATIENT
Start: 2019-04-21 | End: 2023-11-06

## 2019-04-20 RX ORDER — CLOPIDOGREL BISULFATE 75 MG/1
75 TABLET ORAL DAILY
Status: DISCONTINUED | OUTPATIENT
Start: 2019-04-20 | End: 2019-04-20 | Stop reason: HOSPADM

## 2019-04-20 RX ORDER — ASPIRIN 325 MG
325 TABLET ORAL DAILY
Qty: 30 TABLET | Refills: 1 | Status: SHIPPED | OUTPATIENT
Start: 2019-05-12 | End: 2022-09-19 | Stop reason: ALTCHOICE

## 2019-04-20 RX ORDER — ATORVASTATIN CALCIUM 40 MG/1
40 TABLET, FILM COATED ORAL DAILY
Qty: 30 TABLET | Refills: 1 | Status: SHIPPED | OUTPATIENT
Start: 2019-04-20 | End: 2022-09-19

## 2019-04-20 RX ADMIN — CLOPIDOGREL BISULFATE 75 MG: 75 TABLET, FILM COATED ORAL at 13:25

## 2019-04-20 RX ADMIN — HUMAN ALBUMIN MICROSPHERES AND PERFLUTREN 9 ML: 10; .22 INJECTION, SOLUTION INTRAVENOUS at 09:59

## 2019-04-20 RX ADMIN — INFLUENZA A VIRUS A/MICHIGAN/45/2015 X-275 (H1N1) ANTIGEN (FORMALDEHYDE INACTIVATED), INFLUENZA A VIRUS A/SINGAPORE/INFIMH-16-0019/2016 IVR-186 (H3N2) ANTIGEN (FORMALDEHYDE INACTIVATED), INFLUENZA B VIRUS B/PHUKET/3073/2013 ANTIGEN (FORMALDEHYDE INACTIVATED), AND INFLUENZA B VIRUS B/MARYLAND/15/2016 BX-69A ANTIGEN (FORMALDEHYDE INACTIVATED) 0.5 ML: 15; 15; 15; 15 INJECTION, SUSPENSION INTRAMUSCULAR at 13:26

## 2019-04-20 RX ADMIN — ASPIRIN 81 MG 81 MG: 81 TABLET ORAL at 10:02

## 2019-04-20 RX ADMIN — GADOBUTROL 7 ML: 604.72 INJECTION INTRAVENOUS at 11:16

## 2019-04-20 ASSESSMENT — ACTIVITIES OF DAILY LIVING (ADL)
PREVIOUS_RESPONSIBILITIES: MEAL PREP;HOUSEKEEPING;LAUNDRY;SHOPPING;YARDWORK;MEDICATION MANAGEMENT;FINANCES;DRIVING;WORK;CHILD CARE
ADLS_ACUITY_SCORE: 10

## 2019-04-20 ASSESSMENT — MIFFLIN-ST. JEOR: SCORE: 1465.47

## 2019-04-20 NOTE — PLAN OF CARE
Reviewed written discharge instructions with patient, his spouse and his brother including medications, stroke warning signs and follow-up appointments - all questions answered. Discharge NIH = 4. Patient discharged home.

## 2019-04-20 NOTE — DISCHARGE INSTRUCTIONS
Please follow up with neurology in 4-6 weeks. You may call any of the following neurology clinics for an appointment or a clinic of your choice. Tell them you were recently hospitalized and need follow up as recommended at discharge.    1. [facility]   [address], [address]   [place] [address]   [phone]  2. [facility]   [address] [facility], [address]   [place] [address]      Your risk factors for stroke or TIA (transient ischemic attack):    Your Risk Factors Your Results Normal Ranges   High blood pressure BP Readings from Last 1 Encounters:   04/20/19 115/69    Less than 120/80   Cholesterol              Total Lab Results   Component Value Date    CHOL 214 04/20/2019      Less than 150    Triglycerides   Lab Results   Component Value Date    TRIG 201 04/20/2019    Less than 150   LDL Lab Results   Component Value Date     04/20/2019       Less than 70   HDL Lab Results   Component Value Date    HDL 35 04/20/2019            Greater than 40 (men)  Greater than 50 (women)   Diabetes Recent Labs   Lab 04/20/19  0820   *    Fasting blood glucose    Smoking/tobacco use  Quit smoking and tobacco   Overweight  Lose 1-2 pounds a week   Lack of exercise  30 minutes moderate activity each day   Other risk factors include carotid (neck) artery disease, atrial fibrillation and stress. You may be on new medicine to treat high blood pressure, cholesterol, diabetes or atrial fibrillation.    Understanding Stroke Booklet given to patient. Please refer to booklet for further information.    Stroke warning signs and symptoms - CALL 911 right away for:  - Sudden numbness or weakness in the face, arm or leg (often on one side of the body).  - Sudden confusion or trouble understanding what is going on.  - Sudden blurred or decreased vision in one or both eyes.  - Sudden trouble speaking, loss of balance, dizziness or problems with coordination.  - Sudden, severe  headache for no reason.  - Fainting or seizures.  - Symptoms may go away then come back suddenly.

## 2019-04-20 NOTE — PLAN OF CARE
Discharge Planner PT   Patient plan for discharge: Home with family and OP OT.  Current status: PT eval completed and discharged. Pt is a 48 yr old male admitted with c/o L facial numbness and L hand weakness. MRI of brain: +ve for acute stroke. Pt lives with spouse and young children in a 1SH with 2STE and rail support. Pt was ind with all ADL's, IADLs, and ambulation. Pt works as a  and is L handed.   Currently pt is at supervision with transfers, ambulation and stair management. Pt noted with decreased strength in LLE and mildly impaired coordination in LLE. LUE strength and coordination is more impacted than LLE.   Barriers to return to prior living situation: None anticipated.   Recommendations for discharge: Home with family support/assist and OP OT and PT.  Rationale for recommendations: Pt will benefit from OP OT to achieve increased strength, and coordination in LUE. Pt might need family assist with IADL's/household chores due to L UE weakness and impaired coordination.        Entered by: Arnold Stover 04/20/2019 4:37 PM

## 2019-04-20 NOTE — PLAN OF CARE
A&Ox4. Neuros intact ex slurred speech, L drift, L facial numbness, L facial droop, L 4/5 hemiparesis. VSS on RA. Tele SD. NPO pending speech eval. Up with SBA. Denies pain. Slept for most of night. Plan for MRI and echo tomorrow.

## 2019-04-20 NOTE — PROGRESS NOTES
04/20/19 0855   General Information   Onset Date 04/19/19   Start of Care Date 04/20/19   Referring Physician Jones Storey MD   Patient Profile Review/OT: Additional Occupational Profile Info See Profile for full history and prior level of function   Patient/Family Goals Statement None stated    Swallowing Evaluation Bedside swallow evaluation   Behaviorial Observations WFL (within functional limits)   Mode of current nutrition NPO   Respiratory Status Room air   Comments Arsenio Pavon is a 48 year old male admitted on 4/19/2019. He presents with left upper and lower extremity weakness and facial droop. Pt denies a hx of dysphagia. Speech continues to be mild-moderately dysarthric, and pt admits it is below baseline. No family presene    Clinical Swallow Evaluation   Oral Musculature anomalies present   Structural Abnormalities none present   Dentition present and adequate   Mucosal Quality good   Oral Labial Strength and Mobility impaired retraction;impaired pursing;impaired seal;impaired coordination   Lingual Strength and Mobility impaired protrusion;impaired coordination   Buccal Strength and Mobility impaired   Laryngeal Function Cough;Swallow;Voicing initiated   Additional Documentation Yes   Swallow Eval   Feeding Assistance minimal assistance required   Clinical Swallow Eval: Thin Liquid Texture Trial   Mode of Presentation, Thin Liquids cup;self-fed   Volume of Liquid or Food Presented 8 oz    Oral Phase of Swallow WFL   Pharyngeal Phase of Swallow intact   Diagnostic Statement Pt tolerated 8 oz of thin liquids without overt s/sx of aspiration, changes in breath sounds or vocal quality    Clinical Swallow Eval: Puree Solid Texture Trial   Mode of Presentation, Puree spoon;self-fed   Volume of Puree Presented 4 oz    Oral Phase, Puree WFL   Pharyngeal Phase, Puree intact   Diagnostic Statement Cough x 1 with pt being impulsive taking large, consecutive bites. Once cued to slow down, pt tolerated without  difficulty    Clinical Swallow Eval: Solid Food Texture Trial   Mode of Presentation, Solid self-fed   Volume of Solid Food Presented 2 crackers    Oral Phase, Solid WFL   Pharyngeal Phase, Solid intact   Diagnostic Statement Mildly disorganized mastication and bolus formation,. Sufficient oral clearance, no s/sx of aspiration    Swallow Compensations   Swallow Compensations Alternate viscosity of consistencies;Pacing   Esophageal Phase of Swallow   Patient reports or presents with symptoms of esophageal dysphagia No   General Therapy Interventions   Planned Therapy Interventions Dysphagia Treatment   Dysphagia treatment Instruction of safe swallow strategies   Swallow Eval: Clinical Impressions   Skilled Criteria for Therapy Intervention Skilled criteria met.  Treatment indicated.   Functional Assessment Scale (FAS) 6   Treatment Diagnosis Minimal oropharyngeal dysphagia    Diet texture recommendations Regular diet;Thin liquids   Recommended Feeding/Eating Techniques alternate between small bites and sips of food/liquid;small sips/bites   Therapy Frequency 5 times/wk   Predicted Duration of Therapy Intervention (days/wks) 1 week    Anticipated Discharge Disposition home w/ outpatient services   Risks and Benefits of Treatment have been explained. Yes   Patient, family and/or staff in agreement with Plan of Care Yes   Clinical Impression Comments Pt seen for swallow evaluation. He continues to present with mild-moderate dysarthria. Left facial droop with reduced strength and ROM persists. Pt tolerated 8 oz of thin liquids by cup sip without overt s/sx of aspiration, changes in breath sounds or vocal quality. Pt presented with a cough x 1 when taking large, consecutive bites of puree. When pt was cued to slow down no additional difficulties occurred. Pt also tolerated 2 regular solid crackers - mastication and bolus formation is slightly disorganized but functional given sufficient time.  Recommend regular solids and  thin liquids, upright positioning, slow rate of intake, small bites/sips.    Total Evaluation Time   Total Evaluation Time (Minutes) 12

## 2019-04-20 NOTE — DISCHARGE SUMMARY
[facility]  Hospitalist Discharge Summary       Date of Admission:  4/19/2019  Date of Discharge:  4/20/2019  Discharging Provider: Jones Storey MD      Discharge Diagnoses     CVA    Follow-ups Needed After Discharge   Follow-up Appointments     Follow-up and recommended labs and tests       Follow up with primary care provider, Nida Redding, within 7 days   for hospital follow- up.  The following labs/tests are recommended: BMP.             Unresulted Labs Ordered in the Past 30 Days of this Admission     No orders found for last 61 day(s).          Discharge Disposition   Discharged to home  Condition at discharge: Stable    Hospital Course      Arsenio Pavon is a 48 year old male admitted on 4/19/2019. He presents with left upper and lower extremity weakness and facial droop.     CVA  Assessment: presents with left upper extremity weakness with slurred speech and left facial droop, consistent with CVA. CTA shows mdoderate short segment stenosis involving the  left internal carotid artery. Unremarkable CTA of the neck. MRI showed Acute 21.4 x 4.9 mm infarct in the right internal capsule posterior limb.ECHO showed visual ejection fraction is estimated at 55-60%. There is no color Doppler evidence of an atrial shunt.  Plan:  - Aspirin/Plavix for 21 days then Aspirin 325 mg daily  - Statin  - Smoking cessation  - Normotension  - PT/OT/ST  - Follow up with [facility] 4-6 weeks  - PT/OT/SLP as OP    Tobacco Abuse  Assessment/Plan: strongly encouraged tobacco cessation. Patient seemed mildly motivated.      Consultations This Hospital Stay   NEUROLOGY IP CONSULT  OCCUPATIONAL THERAPY ADULT IP CONSULT  SMOKING CESSATION PROGRAM IP CONSULT  PHYSICAL THERAPY ADULT IP CONSULT  SPEECH LANGUAGE PATH ADULT IP CONSULT  SWALLOW EVAL SPEECH PATH AT BEDSIDE IP CONSULT  SMOKING CESSATION PROGRAM IP CONSULT    Code Status   Full Code    Time Spent on this Encounter   I, Jones Storey, personally saw the patient today  and spent greater than 30 minutes discharging this patient.       Jones Storey MD  [facility]  ______________________________________________________________________    Physical Exam   Vital Signs: Temp: 98.8  F (37.1  C) Temp src: Oral BP: 115/69 Pulse: 63 Heart Rate: 76 Resp: 18 SpO2: 96 % O2 Device: None (Room air)    Weight: 145 lbs 0 oz    Primary Care Physician   Nida Redding    Discharge Orders      Physical Therapy Referral      Occupational Therapy Referral      Speech Therapy Referral      Reason for your hospital stay    You had a stroke     Follow-up and recommended labs and tests     Follow up with primary care provider, Nida Redding, within 7 days for hospital follow- up.  The following labs/tests are recommended: BMP.     Activity    Your activity upon discharge: activity as tolerated     Diet    Follow this diet upon discharge: Orders Placed This Encounter      Regular Diet Adult       Significant Results and Procedures   Most Recent 3 CBC's:  Recent Labs   Lab Test 04/20/19  0820 04/19/19  1454   WBC 5.0 8.7   HGB 14.1 14.2   MCV 90 90    229     Most Recent 3 BMP's:  Recent Labs   Lab Test 04/20/19  0820 04/19/19  1454 12/03/15  0953    142  --    POTASSIUM 4.1 3.8  --    CHLORIDE 113* 111*  --    CO2 25 22  --    BUN 13 18  --    CR 0.80 0.76  --    ANIONGAP 5 9  --    SILVERIO 8.2* 8.9  --    * 117* 106*     Most Recent 2 LFT's:No lab results found.  Most Recent 3 INR's:  Recent Labs   Lab Test 04/19/19  1454   INR 0.97   ,   Results for orders placed or performed during the hospital encounter of 04/19/19   CT Head w/o Contrast    Narrative    CT SCAN OF THE HEAD WITHOUT CONTRAST   4/19/2019 3:05 PM     HISTORY: Numbness and speech difficulty.    TECHNIQUE:  Axial images of the head and coronal reformations without  IV contrast material. Radiation dose for this scan was reduced using  automated exposure control, adjustment of the mA and/or kV according  to  patient size, or iterative reconstruction technique.    COMPARISON: None.    FINDINGS:  The cerebral hemispheres, brainstem, and cerebellum  demonstrate normal morphology and attenuation. No evidence of acute  ischemia, hemorrhage, mass, mass effect, or hydrocephalus. The  visualized calvarium, skull base, paranasal sinuses, and extracranial  soft tissues are unremarkable.      Impression    IMPRESSION: No evidence of acute ischemia or hemorrhage.    Findings were discussed by phone between Dr. Allen and Dr. Garcia  at 3:09 PM on 4/19/2019.    JAZMÍN ALLEN MD   CTA Head Neck with Contrast    Narrative    CT ANGIOGRAM OF THE HEAD AND NECK WITH CONTRAST  4/19/2019 3:06 PM     HISTORY: Numbness and speech difficulty.    TECHNIQUE:  CT angiography with an injection of 70 mL [id number]-370 IV  with scans through the head and neck. Images were transferred to a  separate 3-D workstation where multiplanar reformations and 3-D images  were created. Estimates of carotid stenoses are made relative to the  distal internal carotid artery diameters except as noted. Radiation  dose for this scan was reduced using automated exposure control,  adjustment of the mA and/or kV according to patient size, or iterative  reconstruction technique.    COMPARISON: None.     CT HEAD FINDINGS: No contrast enhancing lesions are identified.    CT ANGIOGRAM HEAD FINDINGS:  The vertebral arteries, basilar artery,  and posterior cerebral arteries are patent. The internal carotid  arteries, anterior cerebral arteries, and middle cerebral arteries are  patent. Left A1 segment is hypoplastic. There is severe  atherosclerotic plaquing involving the supraclinoid left internal  carotid artery where there is a moderate short segment stenosis. No  aneurysms are identified. Dural venous sinuses are without appreciable  abnormality. Right transverse sinus is dominant. Gaze is deviated to  the right.    CT ANGIOGRAM NECK FINDINGS:  The bilateral common  carotid, internal  carotid, external carotid, and vertebral arteries are patent. No  evidence of flow-limiting stenosis at the carotid bifurcations. Left  vertebral artery is dominant. Bilateral vertebral artery origins are  widely patent.     Mild degenerative changes are present throughout the cervical spine.       Impression    IMPRESSION:   1. CTA head: Moderate short segment stenosis involving the  supraclinoid left internal carotid artery.  2. CTA neck: Unremarkable CTA of the neck. No evidence of  flow-limiting stenosis at the carotid bifurcations.    JAZMÍN HINES MD   MRI Brain w & w/o contrast    Narrative    MRI OF THE BRAIN WITHOUT AND WITH CONTRAST April 20, 2019 11:15 AM     HISTORY: Numbness and speech difficulty.    COMPARISON: Head CT 4/19/2019.    TECHNIQUE: Axial diffusion-weighted with ADC map, T2-weighted with fat  saturation, T1-weighted and turboFLAIR and coronal T1-weighted images  of the brain were obtained without intravenous contrast. Following  intravenous administration of gadolinium (10 mL Gadavist), axial  turboFLAIR and coronal T1-weighted images of the brain were obtained.     FINDINGS:   INTRACRANIAL CONTENTS: Acute 21.4 x 4.9 mm infarct in the right  internal capsule posterior limb. The infarct extends into the  inferolateral margin of the globus pallidus. No mass effect or  hemorrhagic transformation. Chronic lacunar infarcts in the right  caudate. The remainder of the parenchymal signal is within normal  limits. Ventricles and sulci are normal for age. Normal position of  the cerebellar tonsils. No pathologic enhancement.    SELLA: No significant abnormality accounting for technique.    OSSEOUS STRUCTURES/SOFT TISSUES: No aggressive osseous lesion  involving the calvarium, skull base, or visualized upper cervical  spine. The major intracranial vascular flow voids are maintained.    ORBITS: No significant abnormality accounting for technique.    SINUSES/MASTOIDS: No significant  paranasal sinus mucosal disease. No  significant middle ear or mastoid effusion.       Impression    IMPRESSION:  1.  Acute 21.4 x 4.9 mm infarct in the right internal capsule  posterior limb. It extends into the inferolateral margin of the globus  pallidus.  2.  No mass effect or hemorrhagic transformation.  3.  Chronic lacunar infarcts in the right caudate.    UMER COOK MD       Discharge Medications   Current Discharge Medication List      START taking these medications    Details   aspirin (ASA) 325 MG tablet Take 1 tablet (325 mg) by mouth daily  Qty: 30 tablet, Refills: 1    Associated Diagnoses: Hyperlipidemia LDL goal <130      aspirin (ASA) 81 MG chewable tablet Take 1 tablet (81 mg) by mouth daily  Qty: 21 tablet, Refills: 0    Associated Diagnoses: Hyperlipidemia LDL goal <130      atorvastatin (LIPITOR) 40 MG tablet 1 tablet (40 mg) by Oral or NG Tube route daily  Qty: 30 tablet, Refills: 1    Associated Diagnoses: Hyperlipidemia LDL goal <130      clopidogrel (PLAVIX) 75 MG tablet Take 1 tablet (75 mg) by mouth daily  Qty: 21 tablet, Refills: 0    Associated Diagnoses: Hyperlipidemia LDL goal <130           Allergies   No Known Allergies

## 2019-04-20 NOTE — PLAN OF CARE
Discharge Planner SLP   Patient plan for discharge: Did not discuss  Current status: Pt seen for swallow evaluation. He continues to present with mild-moderate dysarthria. Left facial droop with reduced strength and ROM persists. Pt tolerated 8 oz of thin liquids by cup sip without overt s/sx of aspiration, changes in breath sounds or vocal quality. Pt presented with a cough x 1 when taking large, consecutive bites of puree. When pt was cued to slow down no additional difficulties occurred. Pt also tolerated 2 regular solid crackers - mastication and bolus formation is slightly disorganized but functional given sufficient time.      Recommend regular solids and thin liquids, upright positioning, slow rate of intake, small bites/sips. Will continue to monitor speech as well     Barriers to return to prior living situation: None per SLP  Recommendations for discharge: Home with OP SLP/Pending other therapy evaluations   Rationale for recommendations: OP SLP for management of dysphagia and speech        Entered by: Makenna Russell 04/20/2019 9:02 AM

## 2019-04-20 NOTE — PLAN OF CARE
Discharge Planner OT   Patient plan for discharge: Home   Current status: Orders received, eval completed, treatment initiated and completed. Pt is a 49 yo male admitted for evaluation of L sided weakness and facial droop, MRI shows subcortical infarct. Pt lives with his wife and children (4 and 8 years old) in a house with stairs to basement and walk in shower. 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     Pt completed transfers and ambulation with SBA no LOB noted, pt states he feels his ambulation is at baseline. Pt LUE shows decreased coordination in fine and gross motor, pt participated in exercises for coordination and was given handouts. Pt given sponge exercises for L hand.   Barriers to return to prior living situation: None   Recommendations for discharge: Home with OP OT to rehabilitate LUE  Rationale for recommendations: Pt is mobilizing well, has continued deficits in LUE, would benefit from continued OT to return to PLOF.        Entered by: Lynda Lenz 04/20/2019 2:29 PM

## 2019-04-20 NOTE — PROGRESS NOTES
04/20/19 1615   Quick Adds   Type of Visit Initial PT Evaluation   Living Environment   Lives With child(fadi), dependent;spouse   Living Arrangements house   Home Accessibility stairs to enter home;stairs within home   Number of Stairs, Main Entrance 2   Stair Railings, Main Entrance railings safe and in good condition   Number of Stairs, Within Home, Primary 10   Stair Railings, Within Home, Primary railings safe and in good condition   Transportation Anticipated family or friend will provide   Living Environment Comment Pt lives with spouse and dependent children in a 1Sh with 2 RICARDO and rail support. Pt has a basement with 1 rail support.    Self-Care   Usual Activity Tolerance good   Current Activity Tolerance good   Regular Exercise No   Equipment Currently Used at Home none   Activity/Exercise/Self-Care Comment Pt was ind with all ADL's and IADLs. Pt works as a  and is L handed.    Functional Level Prior   Ambulation 0-->independent   Transferring 0-->independent   Toileting 0-->independent   Bathing 0-->independent   Communication 0-->understands/communicates without difficulty   Swallowing 0-->swallows foods/liquids without difficulty   Cognition 0 - no cognition issues reported   Fall history within last six months no   Which of the above functional risks had a recent onset or change? none   Prior Functional Level Comment Pt was ind with ambualtion.    General Information   Onset of Illness/Injury or Date of Surgery - Date 04/19/19   Referring Physician Jones Storey MD   Patient/Family Goals Statement Go home   Pertinent History of Current Problem (include personal factors and/or comorbidities that impact the POC) Pt is a 48 yr old male admitted with c/o L hand weakness and L facial numbness. MRI of brain: +ve for acute infarct in the R internal capsule.     Precautions/Limitations fall precautions   Weight-Bearing Status - LLE full weight-bearing   Weight-Bearing Status - RLE full weight-bearing  "  General Observations Pleasant and cooperative   General Info Comments Activity: up with assist.   Cognitive Status Examination   Orientation orientation to person, place and time   Level of Consciousness alert   Follows Commands and Answers Questions 100% of the time   Personal Safety and Judgment impulsive   Memory intact   Pain Assessment   Patient Currently in Pain No   Range of Motion (ROM)   ROM Comment BLE: WFL   Strength   Strength Comments LLE: 4/5, RLE: 5/5   Bed Mobility   Bed Mobility Comments Supine<>sit: mod I   Transfer Skills   Transfer Comments STS at supervision   Gait   Gait Comments 400 ft without AD's and supervision, no LOB or SOB noted.   Balance   Balance Comments sitting:good   Sensory Examination   Sensory Perception no deficits were identified   Coordination   Coordination Comments LLE: mildy impaired for heel to shin   Muscle Tone   Muscle Tone no deficits were identified   General Therapy Interventions   Intervention Comments Pt is near usual baseline for fucntional mobiltiy. All intervnetions are deferred to OP OT.    Clinical Impression   Criteria for Skilled Therapeutic Intervention evaluation only   PT Diagnosis Impaired coordination   Influenced by the following impairments Decreased coordination and strength in LUE and LLE   Functional limitations due to impairments Assistance needed with mobility   Clinical Presentation Stable/Uncomplicated   Clinical Presentation Rationale Current fucntional presenation.   Clinical Decision Making (Complexity) Low complexity   Anticipated Discharge Disposition Home with Assist;Home with Outpatient Therapy   Risk & Benefits of therapy have been explained Yes   Patient, Family & other staff in agreement with plan of care Yes   Clinical Impression Comments Pt presents with impaired coordination and strength in LUE>L LE limting ADL's. Pt will benefit from OP OT/PT to achieve PLOF.    [facility] AM-PAC  \"6 Clicks\" V.2 Basic Mobility Inpatient " Short Form   1. Turning from your back to your side while in a flat bed without using bedrails? 4 - None   2. Moving from lying on your back to sitting on the side of a flat bed without using bedrails? 4 - None   3. Moving to and from a bed to a chair (including a wheelchair)? 4 - None   4. Standing up from a chair using your arms (e.g., wheelchair, or bedside chair)? 4 - None   5. To walk in hospital room? 4 - None   6. Climbing 3-5 steps with a railing? 4 - None   Basic Mobility Raw Score (Score out of 24.Lower scores equate to lower levels of function) 24   Total Evaluation Time   Total Evaluation Time (Minutes) 20

## 2019-04-20 NOTE — PLAN OF CARE
Pt alert and oriented x4. VSS. Tele NSR. Neuros stable with L facial droop and numbness, slurred speech, LUE drift and LUE weakness (4/5). CMS intact. MRI positive for R internal capsule CVA. Denies pain. Tolerating regular diet. Ambulating with SBA. Plan to discharge home after OT/PT eval and recommendations are completed.

## 2019-04-20 NOTE — PROGRESS NOTES
"[facility], [facility]    Vascular Neurology Progress Note    Name: Arsenio Pavon  YOB: 1970  MRN: [id number]  Admission Date: 4/19/2019  Date of Service:04/20/2019   Hospital Day: 2                                             24 hour event summary:                                         No acute events overnight  Continues to complain of left sided numbness and weakness    Physical Examination:     /79 (BP Location: Left arm)   Pulse 63   Temp 98  F (36.7  C) (Oral)   Resp 18   Ht 1.676 m (5' 6\")   Wt 65.8 kg (145 lb)   SpO2 95%   BMI 23.40 kg/m      General Appearance: No acute distress  Cardiovascular: Regular rate and rhythm, no m/r/g  Lungs: On room air  Abdomen: Soft, not tender, not distended  Neuro:  Mental Status Exam: Awake, alert, interactive  Cranial Nerves: Mild L facial droop. PERRL, EOMI, mild decreased sensation to light touch L face  Motor: L arm drift, strength 5/5 throughout  Reflexes: no clonus  Sensory: decreased sensation to fine touch LUE and LLE        Coordination: FTN and HTS intact  Gait: Deferred    Medications:     Scheduled Meds:    aspirin  81 mg Oral Daily     atorvastatin  40 mg Oral or NG Tube Daily at 8 pm     gadobutrol  7 mL Intravenous Once     influenza vaccine adult (product based on age)  0.5 mL Intramuscular Prior to discharge     sodium chloride (PF)  3 mL Intracatheter Q8H     PRN Meds: hydrALAZINE, - MEDICATION INSTRUCTIONS -, melatonin, naloxone, ondansetron **OR** ondansetron, sodium chloride (PF)    Data:   CBC RESULTS:   Recent Labs   Lab Test 04/20/19  0820 04/19/19  1454   WBC 5.0 8.7   RBC 4.60 4.61   HGB 14.1 14.2   HCT 41.5 41.3    229   Basic Metabolic Panel:  Recent Labs   Lab Test 04/20/19  0820 04/19/19  1454 12/03/15  0953    142  --    POTASSIUM 4.1 3.8  --    CHLORIDE 113* 111*  --    CO2 25 22  --    BUN 13 18  --    CR 0.80 0.76  --    * 117* 106*   SILVERIO 8.2* " 8.9  --    INR  Recent Labs   Lab Test 04/19/19  1454   INR 0.97   Lipid Profile:  Recent Labs   Lab Test 04/20/19  0820 12/03/15  0953   CHOL 214* 224*   HDL 35* 38*   * 145*   TRIG 201* 204*   A1C:   Recent Labs   Lab Test 04/20/19  0820   A1C 5.5   Troponin I:   Recent Labs   Lab Test 04/20/19 0820   TROPI <0.015     Imaging/ workup:     CT head: no acute findings  CTA head and neck: Mild short segment stenosis L ICA    MRI brain:       TTE: EF 55-60%, normal LA, no PFO    Assessment and Plan:                                           48 year old male with PMH of tobacco abuse, family history of coronary artery disease who presents for evaluation of left arm weakness, slurred speech, facial droop. MRI shows subcortical infarct likely from small vessel disease    #Right internal capsule infarct  - Neuro checks Q 4 hours  - Aspirin 81 mg + Plavix 75 mg daily for 3 weeks  - After 3 weeks, stop Plavix and continue Aspirin 325 mg daily  - 30 day cardiac event monitor   - Lipitor 40 mg daily  - PT / OT and speech evaluation  - Stroke education    #Follow up  - PCP in 1-2 weeks  - [facility] in 4-6 weeks    Images and plan discussed with patient and family, stroke team will sign off at this time, Case was seen and discussed with Dr Jimena Spence MD  Vascular Neurology fellow  Pager # [phone]

## 2019-04-20 NOTE — PROVIDER NOTIFICATION
"Text page to Dr. Spence, \"MRI resulted, +infarct. Family is here. Can you come speak with them? Thanks!\"  "

## 2019-04-20 NOTE — PROGRESS NOTES
04/20/19 1400   Quick Adds   Type of Visit Initial Occupational Therapy Evaluation   Living Environment   Lives With spouse;child(fadi), dependent   Living Arrangements house   Home Accessibility stairs within home   Number of Stairs, Within Home, Primary 10   Transportation Anticipated family or friend will provide;car, drives self   Living Environment Comment Pt lives in a house with stairs down to basement. Pt has a walk in shower   Self-Care   Usual Activity Tolerance excellent   Current Activity Tolerance moderate   Functional Level   Ambulation 0-->independent   Transferring 0-->independent   Toileting 0-->independent   Bathing 0-->independent   Dressing 0-->independent   Eating 0-->independent   Communication 0-->understands/communicates without difficulty   Swallowing 0-->swallows foods/liquids without difficulty   Cognition 0 - no cognition issues reported   Fall history within last six months no   Which of the above functional risks had a recent onset or change? none   Prior Functional Level Comment Per pt he was IND in all ADLs and IADLs. 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 Pt has two children ages 8 and 4.    General Information   Onset of Illness/Injury or Date of Surgery - Date 04/19/19   Referring Physician Jones Storey MD   Patient/Family Goals Statement To return to work and have L hand/arm working    Additional Occupational Profile Info/Pertinent History of Current Problem Pt is a 47 yo male admitted for evaluation of L sided weakness and facial droop, MRI shows subcortical infarct    Precautions/Limitations fall precautions   Cognitive Status Examination   Orientation orientation to person, place and time   Level of Consciousness alert   Cognitive Comment Pt somewhat impulsive, unclear if this is that he feels he does not need caution or if lack of awarenss. Somewhat flat affect   Visual Perception   Visual Perception Comments Required some  cues to visually attend, pt may have been disinterested. No visual deficits noted in vision screen.    Sensory Examination   Sensory Comments Pt denies BUE numbness or changes   Pain Assessment   Patient Currently in Pain No   Integumentary/Edema   Integumentary/Edema Comments some mild swelling noted in L hand   Range of Motion (ROM)   ROM Comment RUE WNL, LUE shows decreased extention in elnow adn fingers/thumb, slight clawing. Reduced shoulder fliexion but WFL for ADLs   Strength   Strength Comments LUE 4/5, RUE 5/5   Hand Strength   Hand Strength Comments L hand 4/5, pt loses  when not concentrating. R hadn 5/5   Coordination   Upper Extremity Coordination Left UE impaired   Coordination Comments fine and gross motor coordination noted, pt is able to correct with effort   Mobility   Bed Mobility Comments SBA   Transfer Skills   Transfer Comments SBA   Transfer Skill: Bed to Chair/Chair to Bed   Level of [place]: Bed to Chair stand-by assist   Balance   Balance Comments no deficits noted   Upper Body Dressing   Level of [place]: Dress Upper Body minimum assist (75% patients effort)   Lower Body Dressing   Level of [place]: Dress Lower Body stand-by assist   Toileting   Level of [place]: Toilet stand-by assist   Grooming   Level of [place]: Grooming stand-by assist   Eating/Self Feeding   Level of [place]: Eating stand-by assist   Instrumental Activities of Daily Living (IADL)   Previous Responsibilities meal prep;housekeeping;laundry;shopping;yardwork;medication management;finances;driving;work;   Activities of Daily Living Analysis   Impairments Contributing to Impaired Activities of Daily Living coordination impaired;motor control impaired;ROM decreased;strength decreased   General Therapy Interventions   Planned Therapy Interventions ADL retraining;IADL retraining;fine motor coordination training;motor coordination training;neuromuscular  "re-education;ROM;strengthening;progressive activity/exercise   Clinical Impression   Criteria for Skilled Therapeutic Interventions Met yes, treatment indicated   OT Diagnosis reduced IND in ADLs   Influenced by the following impairments coordination impaired;motor control impaired;ROM decreased;strength decreased   Assessment of Occupational Performance 3-5 Performance Deficits   Identified Performance Deficits dressing, toileting, IADLs   Clinical Decision Making (Complexity) Low complexity   Therapy Frequency daily   Predicted Duration of Therapy Intervention (days/wks) 2 days   Anticipated Discharge Disposition Home with Outpatient Therapy   Risks and Benefits of Treatment have been explained. Yes   Patient, Family & other staff in agreement with plan of care Yes   [facility] TM \"6 Clicks\"   2016, Trustees of [facility], under license to [employer].  All rights reserved.   6 Clicks Short Forms Daily Activity Inpatient Short Form   [facility]  \"6 Clicks\" Daily Activity Inpatient Short Form   1. Putting on and taking off regular lower body clothing? 3 - A Little   2. Bathing (including washing, rinsing, drying)? 3 - A Little   3. Toileting, which includes using toilet, bedpan or urinal? 3 - A Little   4. Putting on and taking off regular upper body clothing? 3 - A Little   5. Taking care of personal grooming such as brushing teeth? 3 - A Little   6. Eating meals? 3 - A Little   Daily Activity Raw Score (Score out of 24.Lower scores equate to lower levels of function) 18   Total Evaluation Time   Total Evaluation Time (Minutes) 10     "

## 2019-04-21 NOTE — PLAN OF CARE
Occupational Therapy Discharge Summary    Reason for therapy discharge:    Discharged to home with outpatient therapy.    Progress towards therapy goal(s). See goals on Care Plan in [facility] electronic health record for goal details.  Goals not met.  Barriers to achieving goals:   discharge from facility.    Therapy recommendation(s):    Continued therapy is recommended.  Rationale/Recommendations:  Pt would benefit from OP OT to address L UE weakness and incoordination to ensure full recovery for ADL/IADLs.

## 2019-04-22 ENCOUNTER — TELEPHONE (OUTPATIENT)
Dept: FAMILY MEDICINE | Facility: CLINIC | Age: 49
End: 2019-04-22

## 2019-04-22 PROBLEM — I63.81 CEREBROVASCULAR ACCIDENT (CVA) DUE TO OCCLUSION OF SMALL ARTERY (H): Status: ACTIVE | Noted: 2019-04-22

## 2019-04-22 NOTE — PROGRESS NOTES
Speech Language Therapy Discharge Summary    Reason for therapy discharge:    Discharged to home with outpatient therapy.    Progress towards therapy goal(s). See goals on Care Plan in [facility] electronic health record for goal details.  Goals partially met.  Barriers to achieving goals:   discharge from facility.    Therapy recommendation(s):    Continued therapy is recommended.  Rationale/Recommendations:  SEE NOTE FROM SLP 4/20 LISTED BELOW.   Discharge Planner SLP   Patient plan for discharge: Did not discuss  Current status: Pt seen for swallow evaluation. He continues to present with mild-moderate dysarthria. Left facial droop with reduced strength and ROM persists. Pt tolerated 8 oz of thin liquids by cup sip without overt s/sx of aspiration, changes in breath sounds or vocal quality. Pt presented with a cough x 1 when taking large, consecutive bites of puree. When pt was cued to slow down no additional difficulties occurred. Pt also tolerated 2 regular solid crackers - mastication and bolus formation is slightly disorganized but functional given sufficient time.       Recommend regular solids and thin liquids, upright positioning, slow rate of intake, small bites/sips. Will continue to monitor speech as well      Barriers to return to prior living situation: None per SLP  Recommendations for discharge: Home with OP SLP/Pending other therapy evaluations   Rationale for recommendations: OP SLP for management of dysphagia and speech

## 2019-04-22 NOTE — TELEPHONE ENCOUNTER
[phone]  Invalid number. Unable to reach patient.     Called emergency contact, Bridger (brother), and left VM message with information to have patient call clinic back.

## 2019-04-23 ENCOUNTER — OFFICE VISIT (OUTPATIENT)
Dept: FAMILY MEDICINE | Facility: CLINIC | Age: 49
End: 2019-04-23
Payer: COMMERCIAL

## 2019-04-23 VITALS
TEMPERATURE: 97.5 F | WEIGHT: 147 LBS | HEIGHT: 66 IN | SYSTOLIC BLOOD PRESSURE: 138 MMHG | HEART RATE: 71 BPM | RESPIRATION RATE: 18 BRPM | DIASTOLIC BLOOD PRESSURE: 80 MMHG | OXYGEN SATURATION: 97 % | BODY MASS INDEX: 23.63 KG/M2

## 2019-04-23 DIAGNOSIS — I63.81 CEREBROVASCULAR ACCIDENT (CVA) DUE TO OCCLUSION OF SMALL ARTERY (H): Primary | ICD-10-CM

## 2019-04-23 DIAGNOSIS — Z71.6 ENCOUNTER FOR SMOKING CESSATION COUNSELING: ICD-10-CM

## 2019-04-23 DIAGNOSIS — F17.200 TOBACCO USE DISORDER: ICD-10-CM

## 2019-04-23 DIAGNOSIS — E78.2 MIXED HYPERLIPIDEMIA: ICD-10-CM

## 2019-04-23 DIAGNOSIS — R73.01 IMPAIRED FASTING GLUCOSE: ICD-10-CM

## 2019-04-23 LAB — HBA1C MFR BLD: 5.8 % (ref 0–5.6)

## 2019-04-23 PROCEDURE — 36415 COLL VENOUS BLD VENIPUNCTURE: CPT | Performed by: FAMILY MEDICINE

## 2019-04-23 PROCEDURE — 80048 BASIC METABOLIC PNL TOTAL CA: CPT | Performed by: FAMILY MEDICINE

## 2019-04-23 PROCEDURE — 83036 HEMOGLOBIN GLYCOSYLATED A1C: CPT | Performed by: FAMILY MEDICINE

## 2019-04-23 PROCEDURE — 99496 TRANSJ CARE MGMT HIGH F2F 7D: CPT | Performed by: FAMILY MEDICINE

## 2019-04-23 PROCEDURE — 99406 BEHAV CHNG SMOKING 3-10 MIN: CPT | Mod: 25 | Performed by: FAMILY MEDICINE

## 2019-04-23 ASSESSMENT — MIFFLIN-ST. JEOR: SCORE: 1474.54

## 2019-04-23 NOTE — PATIENT INSTRUCTIONS
1. Continue to take the plavix  And the 325 mgm aspirin --with food     2. See neurology     3. Get help from PT     4. QUIT SMOKING !!!

## 2019-04-23 NOTE — LETTER
April 24, 2019      Arsenio Pavon  [address]  [place] [facility] [address]        Dear ,    We are writing to inform you of your test results.    Prediabetic but not diabetic yet     Resulted Orders   Hemoglobin A1c   Result Value Ref Range    Hemoglobin A1C 5.8 (H) 0 - 5.6 %      Comment:      Normal <5.7% Prediabetes 5.7-6.4%  Diabetes 6.5% or higher - adopted from ADA   consensus guidelines.         If you have any questions or concerns, please call the clinic at the number listed above.       Sincerely,        Carey Fox MD

## 2019-04-23 NOTE — NURSING NOTE
"Chief Complaint   Patient presents with     Hospital F/U     /80   Pulse 71   Temp 97.5  F (36.4  C) (Tympanic)   Resp 18   Ht 1.676 m (5' 6\")   Wt 66.7 kg (147 lb)   SpO2 97%   BMI 23.73 kg/m   Estimated body mass index is 23.73 kg/m  as calculated from the following:    Height as of this encounter: 1.676 m (5' 6\").    Weight as of this encounter: 66.7 kg (147 lb).  BP completed using cuff size: dre Johnson CMA    Health Maintenance Due   Topic Date Due     TOBACCO CESSATION COUNSELING Q1 YR  1969     HIV SCREEN (SYSTEM ASSIGNED)  11/16/1987     PREVENTIVE CARE VISIT  12/03/2016     PHQ-2  01/01/2019     Health Maintenance reviewed at today's visit patient asked to schedule/complete:   Routine Health Visit: Patient agrees to schedule    "

## 2019-04-23 NOTE — PROGRESS NOTES
SUBJECTIVE:   Arsenio Pavon is a 48 year old male who presents to clinic today for the following   health issues:    Hospital Follow-up Visit:    Hospital/Nursing Home/IP Rehab Facility: [facility]  Date of Admission: 04/19/2019  Date of Discharge: 04/20/2019  Reason(s) for Admission: CVA            Problems taking medications regularly:  None       Medication changes since discharge: Yes       Problems adhering to non-medication therapy:  None    Summary of hospitalization:  [facility] discharge summary reviewed  Diagnostic Tests/Treatments reviewed.  Follow up needed: see below   Other Healthcare Providers Involved in Patient s Care:         Specialist appointment - neurol  and PT   Update since discharge: improved.     Post Discharge Medication Reconciliation: discharge medications reconciled and changed, per note/orders (see AVS).  Plan of care communicated with patient     Coding guidelines for this visit:  Type of Medical   Decision Making Face-to-Face Visit       within 7 Days of discharge Face-to-Face Visit        within 14 days of discharge   Moderate Complexity 82082 [id number]   High Complexity 76519 [id number]        Cerebrovascular Follow-up:CVA of 4-19-19with residual Lt UE and facial weakness       Patient history: CVA    Residual symptoms: None and Weakness in the arm on the left    Worsened or new symptoms since last visit: No but still the same     Daily aspirin use: Yes& Plavix     Hypertension controlled: Yes    MIxed Hyperlipidemia Follow-Up      Rate your low fat/cholesterol diet?: good    Taking statin?  Yes, no muscle aches from 40mgm atorvastatin --started in the hospital     Other lipid medications/supplements?:  None    LDL = 140     Trig = 200    TOBACCO ABUSE    - [age] y/o   -started when came here from [place]   -1ppd= 24 pk yr hx   -since in hosp has cut back to 7 cigs / d         Amount of exercise or physical activity: None    Problems taking medications regularly:  "No    Medication side effects: none    Diet: regular (no restrictions)    Glucose Intolerance   Follow-up      Patient is checking blood sugars: not at all    To 117 FBS     Diabetic concerns: None     Symptoms of hypoglycemia (low blood sugar): none     Paresthesias (numbness or burning in feet) or sores: No     Date of last diabetic eye exam: never     BP Readings from Last 2 Encounters:   04/23/19 138/80   04/20/19 115/69     Hemoglobin A1C (%)   Date Value   04/23/2019 5.8 (H)   04/20/2019 5.5     LDL Cholesterol Calculated (mg/dL)   Date Value   04/20/2019 139 (H)   12/03/2015 145 (H)       Diabetes Management Resources    Additional history: as documented    Reviewed  and updated as needed this visit by clinical staff  Tobacco  Allergies  Meds  Problems  Med Hx  Surg Hx  Fam Hx  Soc Hx          Reviewed and updated as needed this visit by Provider         Labs reviewed in EPIC    ROS:  CONSTITUTIONAL: NEGATIVE for fever, chills, change in weight  INTEGUMENTARY/SKIN: NEGATIVE for worrisome rashes, moles or lesions  EYES: NEGATIVE for vision changes or irritation  ENT/MOUTH: NEGATIVE for ear, mouth and throat problems  RESP: NEGATIVE for significant cough or SOB  BREAST: NEGATIVE for masses, tenderness or discharge  CV: NEGATIVE for chest pain, palpitations or peripheral edema  GI: NEGATIVE for nausea, abdominal pain, heartburn, or change in bowel habits  : NEGATIVE for frequency, dysuria, or hematuria  MUSCULOSKELETAL: NEGATIVE for significant arthralgias or myalgia  NEURO: NEGATIVE for weakness, dizziness or paresthesias, POSITIVE for  and weakness of Lt UE and L facial droop   ENDOCRINE: NEGATIVE for temperature intolerance, skin/hair changes  HEME: NEGATIVE for bleeding problems  PSYCHIATRIC: NEGATIVE for changes in mood or affect    OBJECTIVE:     /80   Pulse 71   Temp 97.5  F (36.4  C) (Tympanic)   Resp 18   Ht 1.676 m (5' 6\")   Wt 66.7 kg (147 lb)   SpO2 97%   BMI 23.73 kg/m    Body " "mass index is 23.73 kg/m .  GENERAL: healthy, alert and no distress  EYES: Eyes grossly normal to inspection, PERRL and conjunctivae and sclerae normal  RESP: lungs clear to auscultation - no rales, rhonchi or wheezes  CV: regular rate and rhythm, normal S1 S2, no S3 or S4, no murmur, click or rub, no peripheral edema and peripheral pulses strong  MS: no gross musculoskeletal defects noted, no edema  SKIN: no suspicious lesions or rashes  NEURO: Normal strength and tone, weakness of Lt UE and face, sensory exam grossly normal and mentation intact  PSYCH: mentation appears normal, tangential, affect normal/bright, anxious and appearance well groomed--poor english   Avoids answering where he works--states \"far away \" as a      Diagnostic Test Results:  Results for orders placed or performed in visit on 04/23/19   Hemoglobin A1c   Result Value Ref Range    Hemoglobin A1C 5.8 (H) 0 - 5.6 %       ASSESSMENT/PLAN:               ICD-10-CM    1. Cerebrovascular accident (CVA) due to occlusion of small artery- 4-19-19 Lt sided UE and facial weakness  I63.81 NEUROLOGY ADULT REFERRAL     [facility]   2. Mixed hyperlipidemia E78.2    3. Tobacco use disorder: 24- 47 y/o @ 1/2 ppd = 10 pk yr hx  F17.200 TOBACCO CESSATION ORDER FOR      SMOKING CESSATION COUNSELING 3-10 MIN   4. Impaired fasting glucose R73.01 Basic metabolic panel     Hemoglobin A1c   5. Encounter for smoking cessation counseling Z71.6        Patient Instructions   1. Continue to take the plavix  And the 325 mgm aspirin --with food     2. See neurology     3. Get help from PT     4. QUIT SMOKING !!!     Time spent with the patient 8mins, more than 50% in counseling and coordinating care, Re quitting smoking     Reviewed the risks   The CVA awas the result of a combination of smoking , hi LDL & somewhat hi glucose   Offered wAys to quit but has been decreasing on his own and will continue he states    Carey Fox, " MD  [facility]

## 2019-04-24 LAB
ANION GAP SERPL CALCULATED.3IONS-SCNC: 4 MMOL/L (ref 3–14)
BUN SERPL-MCNC: 17 MG/DL (ref 7–30)
CALCIUM SERPL-MCNC: 8.6 MG/DL (ref 8.5–10.1)
CHLORIDE SERPL-SCNC: 110 MMOL/L (ref 94–109)
CO2 SERPL-SCNC: 27 MMOL/L (ref 20–32)
CREAT SERPL-MCNC: 0.94 MG/DL (ref 0.66–1.25)
GFR SERPL CREATININE-BSD FRML MDRD: >90 ML/MIN/{1.73_M2}
GLUCOSE SERPL-MCNC: 91 MG/DL (ref 70–99)
POTASSIUM SERPL-SCNC: 4.2 MMOL/L (ref 3.4–5.3)
SODIUM SERPL-SCNC: 141 MMOL/L (ref 133–144)

## 2019-04-24 NOTE — RESULT ENCOUNTER NOTE
Imtiaz Cesar,    I just wanted to let you know that your lab results have been reviewed and are attached.    - Carey Fox MD is currently out of the office.  - Your metabolic panel shows:  normal electrolytes (sodium, potassium, calcium), kidney function (creatinine and GFR) and blood sugar.    Please let me know if you have any questions and have a great week!    Sincerely,    Amanda Rodgers PA-C    [facility]  [address] [facility] [address]  [place], [place] [address] [phone] (p)

## 2019-05-01 NOTE — PATIENT INSTRUCTIONS
Preventive Health Recommendations  Male Ages 40 to 49    Yearly exam:             See your health care provider every year in order to  o   Review health changes.   o   Discuss preventive care.    o   Review your medicines if your doctor has prescribed any.    You should be tested each year for STDs (sexually transmitted diseases) if you re at risk.     Have a cholesterol test every 5 years.     Have a colonoscopy (test for colon cancer) if someone in your family has had colon cancer or polyps before age 50.     After age 45, have a diabetes test (fasting glucose). If you are at risk for diabetes, you should have this test every 3 years.      Talk with your health care provider about whether or not a prostate cancer screening test (PSA) is right for you.    Shots: Get a flu shot each year. Get a tetanus shot every 10 years.     Nutrition:    Eat at least 5 servings of fruits and vegetables daily.     Eat whole-grain bread, whole-wheat pasta and brown rice instead of white grains and rice.     Get adequate Calcium and Vitamin D.     Lifestyle    Exercise for at least 150 minutes a week (30 minutes a day, 5 days a week). This will help you control your weight and prevent disease.     Limit alcohol to one drink per day.     No smoking.     Wear sunscreen to prevent skin cancer.     See your dentist every six months for an exam and cleaning.     1. Shingrex is a 2 shot series that prevents shingles 97% of the time, as opposed to the old shingles shot that only prevented it at 40-50%  It costs less for medicare at a pharmacy  You should get it starting at 50 yrs old get the 2nd shot 5-6 mo after the first one    2. Please do your breast exam every mo, when you  Change the  calendar page or set an alarm on your cell phone Do a  visual check for dimples, inversion or indentation or any different position of the nipple Feel manually  for any 1cm or larger  size mass ie about the size of an almond Be sure to cover the  entire area of both breasts : this extends back to the back on either side and from the collar bone to the bottom of the breasts where you can begin to feel ribs.  Men are advised to do this exam also as they now have a higher rate of breast cancer , like women do .     3. Continue the clopidogrel and 81 mgm aspirin till gone   Then go to 325 mgm aspirin --regular adult aspirin     4. You need to see neurology#### before out of clopridogrel in 2 weeks     5.  The future lab has been ordered. Please call us at [phone] to make a lab appointment. For early JUne       6. Continue the atorvastatin ##### To lower the cholesterol     7. The stroke was from the high cholesterol , high blood sugar , smoking and BP     8. QUIT SMOKING !!!!!!

## 2019-05-01 NOTE — PROGRESS NOTES
SUBJECTIVE:   CC: Arsenio Pavon is an 48 year old male who presents for preventative health visit.     Healthy Habits:     Getting at least 3 servings of Calcium per day:  NO    Bi-annual eye exam:  Yes    Dental care twice a year:  Yes    Sleep apnea or symptoms of sleep apnea:  None    Diet:  Regular (no restrictions)    Duration of exercise:  N/A    Taking medications regularly:  Yes    Barriers to taking medications:  None    Medication side effects:  Not applicable    PHQ-2 Total Score: 0    Additional concerns today:  No    Glucose Intolerance   Follow-up      Patient is checking blood sugars: not at all    Diabetic concerns: had a CVA last mo      Symptoms of hypoglycemia (low blood sugar): none     Paresthesias (numbness or burning in feet) or sores: No     Date of last diabetic eye exam: 2018    Diabetes Management Resources    MIxed Hyperlipidemia Follow-Up      Rate your low fat/cholesterol diet?: good    Taking statin?  Yes, no muscle aches [id number] atorvastatin-placed on this In hospital with CVA  Last mo     Other lipid medications/supplements?:  None    Prior to RX : LDL = 144 and trig = 200     Elevated BP, not Hypertension       Outpatient blood pressures are not being checked.    BP here < 140/80 till now is 88 harkins --incl on rept     Low Salt Diet: not monitoring salt    BP Readings from Last 2 Encounters:   05/02/19 118/86   04/23/19 138/80     Hemoglobin A1C (%)   Date Value   04/23/2019 5.8 (H)   04/20/2019 5.5     LDL Cholesterol Calculated (mg/dL)   Date Value   04/20/2019 139 (H)   12/03/2015 145 (H)       CVA      - weak Lt hemiparesis   -4-19   - placed on plavix and 81mgm asa for 21 d and then to go to 325mgm asa   -but no neurol f/u     TOBACCO ABUSE    - chronic hx of smoking   -since 2 weeks ago when counselled to quit as is a part cause of the CVA , has decreased to 2 cigs per d and is trying to quit     Today's PHQ-2 Score:   PHQ-2 ( 1999 [employer]) 5/2/2019   Q1: Little interest or  "pleasure in doing things 0   Q2: Feeling down, depressed or hopeless 0   PHQ-2 Score 0   Q1: Little interest or pleasure in doing things Not at all   Q2: Feeling down, depressed or hopeless Not at all   PHQ-2 Score 0       Abuse: Current or Past(Physical, Sexual or Emotional)- No  Do you feel safe in your environment? Yes    Social History     Tobacco Use     Smoking status: Current Every Day Smoker     Packs/day: 0.25     Types: Cigarettes     Smokeless tobacco: Current User   Substance Use Topics     Alcohol use: No     Alcohol Use 5/2/2019   Prescreen: >3 drinks/day or >7 drinks/week? No   Prescreen: >3 drinks/day or >7 drinks/week? -   No flowsheet data found.    Last PSA: No results found for: PSA    Reviewed orders with patient. Reviewed health maintenance and updated orders accordingly - Yes  Labs reviewed in EPIC    Reviewed and updated as needed this visit by clinical staff  Tobacco  Allergies  Meds  Problems  Med Hx  Surg Hx  Fam Hx  Soc Hx          Reviewed and updated as needed this visit by Provider            Review of Systems  CONSTITUTIONAL: NEGATIVE for fever, chills, change in weight  INTEGUMENTARY/SKIN: NEGATIVE for worrisome rashes, moles or lesions  EYES: NEGATIVE for vision changes or irritation  ENT: NEGATIVE for ear, mouth and throat problems  RESP: NEGATIVE for significant cough or SOB  CV: NEGATIVE for chest pain, palpitations or peripheral edema  GI: NEGATIVE for nausea, abdominal pain, heartburn, or change in bowel habits   male: negative for dysuria, hematuria, decreased urinary stream, erectile dysfunction, urethral discharge  MUSCULOSKELETAL: NEGATIVE for significant arthralgias or myalgia  NEURO: NEGATIVE for weakness, dizziness or paresthesias  HEME/ALLERGY/IMMUNE: NEGATIVE for bleeding problems  PSYCHIATRIC: NEGATIVE for changes in mood or affect    OBJECTIVE:   /88   Pulse 78   Temp 96.9  F (36.1  C) (Tympanic)   Resp 16   Ht 1.676 m (5' 6\")   Wt 66.2 kg (146 " lb)   SpO2 98%   BMI 23.57 kg/m      Physical Exam  GENERAL: healthy, alert and no distress  EYES: Eyes grossly normal to inspection, PERRL and conjunctivae and sclerae normal  NECK: no adenopathy, no asymmetry, masses, or scars and thyroid normal to palpation  RESP: lungs clear to auscultation - no rales, rhonchi or wheezes  CV: regular rate and rhythm, normal S1 S2, no S3 or S4, no murmur, click or rub, no peripheral edema and peripheral pulses strong  ABDOMEN: soft, nontender, no hepatosplenomegaly, no masses and bowel sounds normal  MS: no gross musculoskeletal defects noted, no edema  SKIN: no suspicious lesions or rashes  NEURO: Normal strength and tone, mentation intact and speech normal  PSYCH: mentation appears normal, affect normal/bright    Diagnostic Test Results:  Results for orders placed or performed in visit on 04/23/19   Basic metabolic panel   Result Value Ref Range    Sodium 141 133 - 144 mmol/L    Potassium 4.2 3.4 - 5.3 mmol/L    Chloride 110 (H) 94 - 109 mmol/L    Carbon Dioxide 27 20 - 32 mmol/L    Anion Gap 4 3 - 14 mmol/L    Glucose 91 70 - 99 mg/dL    Urea Nitrogen 17 7 - 30 mg/dL    Creatinine 0.94 0.66 - 1.25 mg/dL    GFR Estimate >90 >60 mL/min/[1.73_m2]    GFR Estimate If Black >90 >60 mL/min/[1.73_m2]    Calcium 8.6 8.5 - 10.1 mg/dL   Hemoglobin A1c   Result Value Ref Range    Hemoglobin A1C 5.8 (H) 0 - 5.6 %       ASSESSMENT/PLAN:       ICD-10-CM    1. Routine general medical examination at a health care facility Z00.00    2. Cerebrovascular accident (CVA) due to occlusion of small artery I63.81 NEUROLOGY ADULT REFERRAL   3. Elevated BP without diagnosis of hypertension R03.0    4. Mixed hyperlipidemia E78.2    5. Impaired fasting glucose R73.01    6. Tobacco use disorder F17.200        COUNSELING:   Reviewed preventive health counseling, as reflected in patient instructions       Regular exercise       Healthy diet/nutrition       Vision screening    BP Readings from Last 1  "Encounters:   05/02/19 120/88     Estimated body mass index is 23.57 kg/m  as calculated from the following:    Height as of this encounter: 1.676 m (5' 6\").    Weight as of this encounter: 66.2 kg (146 lb).    BP Screening:   Last 3 BP Readings:    BP Readings from Last 3 Encounters:   05/02/19 118/86   04/23/19 138/80   04/20/19 115/69       The following was recommended to the patient:  Re-screen within 4 weeks and recommend lifestyle modifications       reports that he has been smoking cigarettes.  He has been smoking about 0.25 packs per day. He uses smokeless tobacco.  Tobacco Cessation Action Plan: is quitting --down to 2 cigs a day      Patient Instructions     Preventive Health Recommendations  Male Ages 40 to 49    Yearly exam:             See your health care provider every year in order to  o   Review health changes.   o   Discuss preventive care.    o   Review your medicines if your doctor has prescribed any.    You should be tested each year for STDs (sexually transmitted diseases) if you re at risk.     Have a cholesterol test every 5 years.     Have a colonoscopy (test for colon cancer) if someone in your family has had colon cancer or polyps before age 50.     After age 45, have a diabetes test (fasting glucose). If you are at risk for diabetes, you should have this test every 3 years.      Talk with your health care provider about whether or not a prostate cancer screening test (PSA) is right for you.    Shots: Get a flu shot each year. Get a tetanus shot every 10 years.     Nutrition:    Eat at least 5 servings of fruits and vegetables daily.     Eat whole-grain bread, whole-wheat pasta and brown rice instead of white grains and rice.     Get adequate Calcium and Vitamin D.     Lifestyle    Exercise for at least 150 minutes a week (30 minutes a day, 5 days a week). This will help you control your weight and prevent disease.     Limit alcohol to one drink per day.     No smoking.     Wear sunscreen " to prevent skin cancer.     See your dentist every six months for an exam and cleaning.     1. Shingrex is a 2 shot series that prevents shingles 97% of the time, as opposed to the old shingles shot that only prevented it at 40-50%  It costs less for medicare at a pharmacy  You should get it starting at 50 yrs old get the 2nd shot 5-6 mo after the first one    2. Please do your breast exam every mo, when you  Change the  calendar page or set an alarm on your cell phone Do a  visual check for dimples, inversion or indentation or any different position of the nipple Feel manually  for any 1cm or larger  size mass ie about the size of an almond Be sure to cover the entire area of both breasts : this extends back to the back on either side and from the collar bone to the bottom of the breasts where you can begin to feel ribs.  Men are advised to do this exam also as they now have a higher rate of breast cancer , like women do .     3. Continue the clopidogrel and 81 mgm aspirin till gone   Then go to 325 mgm aspirin --regular adult aspirin     4. You need to see neurology#### before out of clopridogrel in 2 weeks     5.  The future lab has been ordered. Please call us at [phone] to make a lab appointment. For early JUne       6. Continue the atorvastatin ##### To lower the cholesterol     7. The stroke was from the high cholesterol , high blood sugar , smoking and BP     8. QUIT SMOKING !!!!!!       I  Explained the treatment and the reason for it   Bishnu that the CVA was from the smoking--so needs to quit   Hi LDL --needs cont the statin and chek lipids in 5 weeks   HTN = today --needs f/u       Counseling Resources:  ATP IV Guidelines  Pooled Cohorts Equation Calculator  FRAX Risk Assessment  ICSI Preventive Guidelines  Dietary Guidelines for Americans, 2010  USDA's MyPlate  ASA Prophylaxis  Lung CA Screening    Carey Fox MD  [facility]

## 2019-05-02 ENCOUNTER — OFFICE VISIT (OUTPATIENT)
Dept: FAMILY MEDICINE | Facility: CLINIC | Age: 49
End: 2019-05-02
Payer: COMMERCIAL

## 2019-05-02 VITALS
OXYGEN SATURATION: 98 % | WEIGHT: 146 LBS | RESPIRATION RATE: 16 BRPM | HEIGHT: 66 IN | TEMPERATURE: 96.9 F | HEART RATE: 78 BPM | DIASTOLIC BLOOD PRESSURE: 86 MMHG | BODY MASS INDEX: 23.46 KG/M2 | SYSTOLIC BLOOD PRESSURE: 118 MMHG

## 2019-05-02 DIAGNOSIS — I63.81 CEREBROVASCULAR ACCIDENT (CVA) DUE TO OCCLUSION OF SMALL ARTERY (H): ICD-10-CM

## 2019-05-02 DIAGNOSIS — R03.0 ELEVATED BP WITHOUT DIAGNOSIS OF HYPERTENSION: ICD-10-CM

## 2019-05-02 DIAGNOSIS — F17.200 TOBACCO USE DISORDER: ICD-10-CM

## 2019-05-02 DIAGNOSIS — R73.01 IMPAIRED FASTING GLUCOSE: ICD-10-CM

## 2019-05-02 DIAGNOSIS — E78.2 MIXED HYPERLIPIDEMIA: ICD-10-CM

## 2019-05-02 DIAGNOSIS — Z00.00 ROUTINE GENERAL MEDICAL EXAMINATION AT A HEALTH CARE FACILITY: Primary | ICD-10-CM

## 2019-05-02 PROCEDURE — 99214 OFFICE O/P EST MOD 30 MIN: CPT | Mod: 25 | Performed by: FAMILY MEDICINE

## 2019-05-02 PROCEDURE — 99396 PREV VISIT EST AGE 40-64: CPT | Performed by: FAMILY MEDICINE

## 2019-05-02 RX ORDER — ASPIRIN 81 MG/1
81 TABLET, CHEWABLE ORAL DAILY
Qty: 21 TABLET | Refills: 0 | Status: CANCELLED | OUTPATIENT
Start: 2019-05-02

## 2019-05-02 RX ORDER — ASPIRIN 325 MG
325 TABLET ORAL DAILY
Qty: 30 TABLET | Refills: 1 | Status: CANCELLED | OUTPATIENT
Start: 2019-05-12

## 2019-05-02 RX ORDER — CLOPIDOGREL BISULFATE 75 MG/1
75 TABLET ORAL DAILY
Qty: 21 TABLET | Refills: 0 | Status: CANCELLED | OUTPATIENT
Start: 2019-05-02

## 2019-05-02 RX ORDER — ATORVASTATIN CALCIUM 40 MG/1
40 TABLET, FILM COATED ORAL DAILY
Qty: 30 TABLET | Refills: 1 | Status: CANCELLED | OUTPATIENT
Start: 2019-05-02

## 2019-05-02 ASSESSMENT — MIFFLIN-ST. JEOR: SCORE: 1470

## 2019-05-02 NOTE — NURSING NOTE
"Chief Complaint   Patient presents with     Physical     /88   Pulse 78   Temp 96.9  F (36.1  C) (Tympanic)   Resp 16   Ht 1.676 m (5' 6\")   Wt 66.2 kg (146 lb)   SpO2 98%   BMI 23.57 kg/m   Estimated body mass index is 23.57 kg/m  as calculated from the following:    Height as of this encounter: 1.676 m (5' 6\").    Weight as of this encounter: 66.2 kg (146 lb).  BP completed using cuff size: regular   Stephanie Johnson CMA    Health Maintenance Due   Topic Date Due     HIV SCREEN (SYSTEM ASSIGNED)  11/16/1987     Health Maintenance reviewed at today's visit patient asked to schedule/complete:   None, Health Maintenance up to date.    "

## 2019-07-18 ENCOUNTER — TELEPHONE (OUTPATIENT)
Dept: MEDSURG UNIT | Facility: CLINIC | Age: 49
End: 2019-07-18

## 2020-03-01 ENCOUNTER — HEALTH MAINTENANCE LETTER (OUTPATIENT)
Age: 50
End: 2020-03-01

## 2020-12-13 ENCOUNTER — HEALTH MAINTENANCE LETTER (OUTPATIENT)
Age: 50
End: 2020-12-13

## 2021-09-26 ENCOUNTER — HEALTH MAINTENANCE LETTER (OUTPATIENT)
Age: 51
End: 2021-09-26

## 2022-01-16 ENCOUNTER — HEALTH MAINTENANCE LETTER (OUTPATIENT)
Age: 52
End: 2022-01-16

## 2022-08-01 ENCOUNTER — OFFICE VISIT (OUTPATIENT)
Dept: URGENT CARE | Facility: URGENT CARE | Age: 52
End: 2022-08-01
Payer: COMMERCIAL

## 2022-08-01 VITALS
RESPIRATION RATE: 18 BRPM | SYSTOLIC BLOOD PRESSURE: 130 MMHG | BODY MASS INDEX: 24.69 KG/M2 | HEART RATE: 69 BPM | TEMPERATURE: 97.7 F | DIASTOLIC BLOOD PRESSURE: 80 MMHG | WEIGHT: 153 LBS | OXYGEN SATURATION: 98 %

## 2022-08-01 DIAGNOSIS — M10.9 ACUTE GOUTY ARTHRITIS: Primary | ICD-10-CM

## 2022-08-01 PROCEDURE — 99203 OFFICE O/P NEW LOW 30 MIN: CPT | Performed by: FAMILY MEDICINE

## 2022-08-01 RX ORDER — INDOMETHACIN 50 MG/1
50 CAPSULE ORAL
Qty: 21 CAPSULE | Refills: 0 | Status: SHIPPED | OUTPATIENT
Start: 2022-08-01 | End: 2023-11-06

## 2022-08-01 NOTE — PROGRESS NOTES
SUBJECTIVE:  Chief Complaint   Patient presents with     Arthritis     Pt woke up this morning with a swollen and painful right foot    .ident who presents with a chief complaint of  right foot and toe(s) second pain.  Symptoms began this am ago , are moderate andstill present.  Context:Injury: no  Ihx of gout    Past Medical History:   Diagnosis Date     Smoking        No past surgical history on file.    Family History   Problem Relation Age of Onset     Colon Cancer Father 64     Diabetes Father      Hypertension Father      Cerebrovascular Disease Father      Hypertension Mother      Hypertension Brother        Social History     Tobacco Use     Smoking status: Current Every Day Smoker     Packs/day: 0.25     Types: Cigarettes     Smokeless tobacco: Current User   Substance Use Topics     Alcohol use: No     ROS:CONSTITUTIONAL:NEGATIVE for fever, chills, change in weight  Review of systems negative except as stated below    EXAM: /80   Pulse 69   Temp 97.7  F (36.5  C) (Tympanic)   Resp 18   Wt 69.4 kg (153 lb)   SpO2 98%   BMI 24.69 kg/m    Exam:foot and toe(s) second  tenderness to palpation and pain with rom  GENERAL APPEARANCE: healthy, alert and no distress  EXTREMITIES: peripheral pulses normal  SKIN: no suspicious lesions or rashes  NEURO: Normal strength and tone, sensory exam grossly normal, mentation intact and speech normal    X-RAY was not done.    ASSESSMENT:     ICD-10-CM    1. Acute gouty arthritis  M10.9 indomethacin (INDOCIN) 50 MG capsule

## 2022-09-19 ENCOUNTER — OFFICE VISIT (OUTPATIENT)
Dept: INTERNAL MEDICINE | Facility: CLINIC | Age: 52
End: 2022-09-19
Payer: COMMERCIAL

## 2022-09-19 VITALS
DIASTOLIC BLOOD PRESSURE: 82 MMHG | OXYGEN SATURATION: 100 % | SYSTOLIC BLOOD PRESSURE: 135 MMHG | HEIGHT: 66 IN | WEIGHT: 153 LBS | HEART RATE: 65 BPM | TEMPERATURE: 98 F | BODY MASS INDEX: 24.59 KG/M2

## 2022-09-19 DIAGNOSIS — M10.9 ACUTE GOUTY ARTHRITIS: ICD-10-CM

## 2022-09-19 DIAGNOSIS — I63.50 CEREBROVASCULAR ACCIDENT (CVA) DUE TO STENOSIS OF CEREBRAL ARTERY (H): ICD-10-CM

## 2022-09-19 DIAGNOSIS — Z00.01 ENCOUNTER FOR ROUTINE ADULT MEDICAL EXAM WITH ABNORMAL FINDINGS: Primary | ICD-10-CM

## 2022-09-19 DIAGNOSIS — Z80.0 FAMILY HISTORY OF COLON CANCER: ICD-10-CM

## 2022-09-19 DIAGNOSIS — Z23 NEED FOR PNEUMOCOCCAL VACCINE: ICD-10-CM

## 2022-09-19 DIAGNOSIS — Z11.4 SCREENING FOR HIV (HUMAN IMMUNODEFICIENCY VIRUS): ICD-10-CM

## 2022-09-19 DIAGNOSIS — Z12.11 SCREEN FOR COLON CANCER: ICD-10-CM

## 2022-09-19 DIAGNOSIS — E78.5 HYPERLIPIDEMIA LDL GOAL <100: ICD-10-CM

## 2022-09-19 DIAGNOSIS — Z23 HIGH PRIORITY FOR 2019-NCOV VACCINE: ICD-10-CM

## 2022-09-19 DIAGNOSIS — Z11.59 NEED FOR HEPATITIS C SCREENING TEST: ICD-10-CM

## 2022-09-19 DIAGNOSIS — Z23 NEED FOR TDAP VACCINATION: ICD-10-CM

## 2022-09-19 DIAGNOSIS — F17.200 TOBACCO USE DISORDER: ICD-10-CM

## 2022-09-19 DIAGNOSIS — Z12.5 SCREENING FOR PROSTATE CANCER: ICD-10-CM

## 2022-09-19 DIAGNOSIS — Z13.6 CARDIOVASCULAR SCREENING; LDL GOAL LESS THAN 100: ICD-10-CM

## 2022-09-19 LAB
ALT SERPL W P-5'-P-CCNC: 42 U/L (ref 0–70)
ANION GAP SERPL CALCULATED.3IONS-SCNC: 5 MMOL/L (ref 3–14)
AST SERPL W P-5'-P-CCNC: 17 U/L (ref 0–45)
BUN SERPL-MCNC: 16 MG/DL (ref 7–30)
CALCIUM SERPL-MCNC: 9.5 MG/DL (ref 8.5–10.1)
CHLORIDE BLD-SCNC: 107 MMOL/L (ref 94–109)
CHOLEST SERPL-MCNC: 237 MG/DL
CO2 SERPL-SCNC: 27 MMOL/L (ref 20–32)
CREAT SERPL-MCNC: 0.78 MG/DL (ref 0.66–1.25)
ERYTHROCYTE [DISTWIDTH] IN BLOOD BY AUTOMATED COUNT: 12.5 % (ref 10–15)
FASTING STATUS PATIENT QL REPORTED: YES
GFR SERPL CREATININE-BSD FRML MDRD: >90 ML/MIN/1.73M2
GLUCOSE BLD-MCNC: 119 MG/DL (ref 70–99)
HCT VFR BLD AUTO: 47 % (ref 40–53)
HCV AB SERPL QL IA: NONREACTIVE
HDLC SERPL-MCNC: 37 MG/DL
HGB BLD-MCNC: 15.7 G/DL (ref 13.3–17.7)
HIV 1+2 AB+HIV1 P24 AG SERPL QL IA: NONREACTIVE
LDLC SERPL CALC-MCNC: 123 MG/DL
LDLC SERPL CALC-MCNC: ABNORMAL MG/DL
MCH RBC QN AUTO: 30.3 PG (ref 26.5–33)
MCHC RBC AUTO-ENTMCNC: 33.4 G/DL (ref 31.5–36.5)
MCV RBC AUTO: 91 FL (ref 78–100)
NONHDLC SERPL-MCNC: 200 MG/DL
PLATELET # BLD AUTO: 224 10E3/UL (ref 150–450)
POTASSIUM BLD-SCNC: 4.1 MMOL/L (ref 3.4–5.3)
PSA SERPL-MCNC: 0.23 UG/L (ref 0–4)
RBC # BLD AUTO: 5.19 10E6/UL (ref 4.4–5.9)
SODIUM SERPL-SCNC: 139 MMOL/L (ref 133–144)
TRIGL SERPL-MCNC: 422 MG/DL
URATE SERPL-MCNC: 7.8 MG/DL (ref 3.5–7.2)
WBC # BLD AUTO: 6.4 10E3/UL (ref 4–11)

## 2022-09-19 PROCEDURE — 90471 IMMUNIZATION ADMIN: CPT | Performed by: INTERNAL MEDICINE

## 2022-09-19 PROCEDURE — 99386 PREV VISIT NEW AGE 40-64: CPT | Mod: 25 | Performed by: INTERNAL MEDICINE

## 2022-09-19 PROCEDURE — 0124A COVID-19,PF,PFIZER BOOSTER BIVALENT: CPT | Performed by: INTERNAL MEDICINE

## 2022-09-19 PROCEDURE — 83721 ASSAY OF BLOOD LIPOPROTEIN: CPT | Mod: 59 | Performed by: INTERNAL MEDICINE

## 2022-09-19 PROCEDURE — 85027 COMPLETE CBC AUTOMATED: CPT | Performed by: INTERNAL MEDICINE

## 2022-09-19 PROCEDURE — 86803 HEPATITIS C AB TEST: CPT | Performed by: INTERNAL MEDICINE

## 2022-09-19 PROCEDURE — G0103 PSA SCREENING: HCPCS | Performed by: INTERNAL MEDICINE

## 2022-09-19 PROCEDURE — 36415 COLL VENOUS BLD VENIPUNCTURE: CPT | Performed by: INTERNAL MEDICINE

## 2022-09-19 PROCEDURE — 84450 TRANSFERASE (AST) (SGOT): CPT | Performed by: INTERNAL MEDICINE

## 2022-09-19 PROCEDURE — 80061 LIPID PANEL: CPT | Performed by: INTERNAL MEDICINE

## 2022-09-19 PROCEDURE — 84550 ASSAY OF BLOOD/URIC ACID: CPT | Performed by: INTERNAL MEDICINE

## 2022-09-19 PROCEDURE — 80048 BASIC METABOLIC PNL TOTAL CA: CPT | Performed by: INTERNAL MEDICINE

## 2022-09-19 PROCEDURE — 87389 HIV-1 AG W/HIV-1&-2 AB AG IA: CPT | Performed by: INTERNAL MEDICINE

## 2022-09-19 PROCEDURE — 99214 OFFICE O/P EST MOD 30 MIN: CPT | Mod: 25 | Performed by: INTERNAL MEDICINE

## 2022-09-19 PROCEDURE — 84460 ALANINE AMINO (ALT) (SGPT): CPT | Performed by: INTERNAL MEDICINE

## 2022-09-19 PROCEDURE — 91312 COVID-19,PF,PFIZER BOOSTER BIVALENT: CPT | Performed by: INTERNAL MEDICINE

## 2022-09-19 PROCEDURE — 90677 PCV20 VACCINE IM: CPT | Performed by: INTERNAL MEDICINE

## 2022-09-19 RX ORDER — ATORVASTATIN CALCIUM 40 MG/1
40 TABLET, FILM COATED ORAL DAILY
Qty: 90 TABLET | Refills: 3 | Status: SHIPPED | OUTPATIENT
Start: 2022-09-19 | End: 2023-10-30

## 2022-09-19 ASSESSMENT — ENCOUNTER SYMPTOMS
DIZZINESS: 0
NAUSEA: 0
COUGH: 0
CHILLS: 0
FEVER: 0
FREQUENCY: 0
HEMATOCHEZIA: 0
HEMATURIA: 0
WEAKNESS: 0
SORE THROAT: 0
HEADACHES: 0
DIARRHEA: 0
ARTHRALGIAS: 0
PALPITATIONS: 0
CONSTIPATION: 0
ABDOMINAL PAIN: 0
DYSURIA: 0
PARESTHESIAS: 0
MYALGIAS: 0
EYE PAIN: 0
NERVOUS/ANXIOUS: 0
HEARTBURN: 0
SHORTNESS OF BREATH: 0
JOINT SWELLING: 0

## 2022-09-19 NOTE — PROGRESS NOTES
"SUBJECTIVE:   CC: Arsenio is an 51 year old who presents for preventative health visit.       Patient has been advised of split billing requirements and indicates understanding: Yes  Healthy Habits:     Getting at least 3 servings of Calcium per day:  Yes    Bi-annual eye exam:  Yes    Dental care twice a year:  Yes    Sleep apnea or symptoms of sleep apnea:  None    Diet:  Regular (no restrictions)    Frequency of exercise:  None    Taking medications regularly:  Yes    Medication side effects:  None    PHQ-2 Total Score: 1    Additional concerns today:  No      1.  History of stroke in April 2019 with acute left-sided weakness.  Symptoms all resolved.  Patient was placed on Plavix for 21 days and told to continue on aspirin once daily indefinitely.  Was also placed on 40 mg atorvastatin and was instructed to continue this indefinitely.  Patient stopped both these medicines since he \"felt fine\".  He did not return for follow-up until now.  No new neurological signs or symptoms.  Vascular risk factors include age over 50, smoking, mild hyperlipidemia.      2.  Family history colon cancer in his father.  Last colonoscopy 6 years ago.  Reports no new gastrointestinal symptoms.    3.  Recent gout attack.  Was seen in urgent care 1 last month received indomethacin with resolution of symptoms.  Urgent care note reviewed   this is the second gout attack he has had in his lifetime.  No prior history of uric acid levels checked.      Today's PHQ-2 Score:   PHQ-2 ( 1999 Pfizer) 9/19/2022   Q1: Little interest or pleasure in doing things 1   Q2: Feeling down, depressed or hopeless 0   PHQ-2 Score 1   PHQ-2 Total Score (12-17 Years)- Positive if 3 or more points; Administer PHQ-A if positive -   Q1: Little interest or pleasure in doing things Several days   Q2: Feeling down, depressed or hopeless Not at all   PHQ-2 Score 1       Abuse: Current or Past(Physical, Sexual or Emotional)- No  Do you feel safe in your environment? " Yes    Have you ever done Advance Care Planning? (For example, a Health Directive, POLST, or a discussion with a medical provider or your loved ones about your wishes): No, advance care planning information given to patient to review.  Patient plans to discuss their wishes with loved ones or provider.      Social History     Tobacco Use     Smoking status: Current Every Day Smoker     Packs/day: 0.25     Types: Cigarettes     Smokeless tobacco: Current User   Substance Use Topics     Alcohol use: No         Alcohol Use 9/19/2022   Prescreen: >3 drinks/day or >7 drinks/week? No   Prescreen: >3 drinks/day or >7 drinks/week? -       Last PSA: No results found for: PSA    Reviewed orders with patient. Reviewed health maintenance and updated orders accordingly - Yes      Reviewed and updated as needed this visit by clinical staff   Tobacco  Allergies  Meds                Reviewed and updated as needed this visit by Provider   Tobacco                   **I reviewed the information recorded in the patient's EPIC chart (including but not limited to medical history, surgical history, family history, problem list, medication list, and allergy list) and updated the information as indicated based on the patients reported information.         Review of Systems   Constitutional: Negative for chills and fever.   HENT: Negative for congestion, ear pain, hearing loss and sore throat.    Eyes: Negative for pain and visual disturbance.   Respiratory: Negative for cough and shortness of breath.    Cardiovascular: Negative for chest pain, palpitations and peripheral edema.   Gastrointestinal: Negative for abdominal pain, constipation, diarrhea, heartburn, hematochezia and nausea.   Genitourinary: Negative for dysuria, frequency, genital sores, hematuria and urgency.   Musculoskeletal: Negative for arthralgias, joint swelling and myalgias.   Skin: Negative for rash.   Neurological: Negative for dizziness, weakness, headaches and  "paresthesias.   Psychiatric/Behavioral: Negative for mood changes. The patient is not nervous/anxious.      CONSTITUTIONAL: NEGATIVE for fever, chills, change in weight  INTEGUMENTARY/SKIN: NEGATIVE for worrisome rashes, moles or lesions  EYES: NEGATIVE for vision changes or irritation  ENT: NEGATIVE for ear, mouth and throat problems  RESP: NEGATIVE for significant cough or SOB  CV: NEGATIVE for chest pain, palpitations or peripheral edema  GI: NEGATIVE for nausea, abdominal pain, heartburn, or change in bowel habits   male: negative for dysuria, hematuria, decreased urinary stream, erectile dysfunction, urethral discharge  MUSCULOSKELETAL: NEGATIVE for significant arthralgias or myalgia  NEURO: NEGATIVE for weakness, dizziness or paresthesias  PSYCHIATRIC: NEGATIVE for changes in mood or affect    OBJECTIVE:   /82   Pulse 65   Temp 98  F (36.7  C) (Oral)   Ht 1.676 m (5' 6\")   Wt 69.4 kg (153 lb)   SpO2 100%   BMI 24.69 kg/m      Physical Exam  GENERAL alert and no distress  EYES:  Normal sclera,conjunctiva, EOMI  HENT: oral and posterior pharynx without lesions or erythema, facies symmetric  NECK: Neck supple. No LAD, without thyroidmegaly.  RESP: Clear to ausculation bilaterally without wheezes or crackles. Normal BS in all fields.  CV: RRR normal S1S2 without murmurs, rubs or gallops.  LYMPH: no cervical lymph adenopathy appreciated  MS: extremities- no gross deformities of the visible extremities noted,   EXT:  no lower extremity edema  PSYCH: Alert and oriented times 3; speech- coherent  SKIN:  No obvious significant skin lesions on visible portions of face     Diagnostic Test Results:  Labs reviewed in Epic    ASSESSMENT/PLAN:     (Z00.01) Encounter for routine adult medical exam with abnormal findings  (primary encounter diagnosis)  Comment: Discussed cardiac disease risk factor modification including screening, preventing, and treating hypertension, elevated lipids, diabetes, and smoking " cessation.    Discussed age appropriate cancer screening recommendations as dictated by age group and past medical history.    Recommended making better food choices as often as possible, including lower carb, lower fat, lower salt diet and moderation in any alcohol intake.    Recommended maintaining regular physical activity/exercise throughout their lifetime.  Recommended safety and injury prevention (i.e. seatbelt use, safety equipment like helmets when biking, etc).       Plan: REVIEW OF HEALTH MAINTENANCE PROTOCOL ORDERS            (I63.50) Cerebrovascular accident (CVA) due to stenosis of cerebral artery (H)  Comment: Discussed secondary risk factor modification and recommended continuing aggressive management of these items.   No new neurological symptoms.  Resume aspirin 81 mg once daily, indefinitely.  Resume statin once daily, indefinitely.  LDL goal ideally below 100, preferably below 75.  Discontinue any and all smoking.  Monitor and manage blood pressure aggressively.  Screen for diabetes, and more importantly low carbohydrate diet to prevent diabetes from occurring.  Plan: REVIEW OF HEALTH MAINTENANCE PROTOCOL ORDERS,         Lipid panel reflex to direct LDL Non-fasting,         AST, ALT, CBC with platelets, Basic metabolic         panel, atorvastatin (LIPITOR) 40 MG tablet,         CANCELED: Lipid panel reflex to direct LDL         Fasting            (E78.5) Hyperlipidemia LDL goal <100  Comment: Discussed guidelines recommending a statin cholesterol lowering medication for any patient with either diabetes and/or vascular disease, aiming for a LDL goal under 100 for sure, ideally under 70, using whatever dose of statin tolerated.    Plan: REVIEW OF HEALTH MAINTENANCE PROTOCOL ORDERS,         Lipid panel reflex to direct LDL Non-fasting,         AST, ALT, atorvastatin (LIPITOR) 40 MG tablet,         CANCELED: Lipid panel reflex to direct LDL         Fasting            (M10.9) Acute gouty  arthritis  Comment: Check uric acid level now that he is 6 to 8 weeks away from his initial episode.  Consider allopurinol once daily should he have an elevated uric acid level.  Plan: REVIEW OF HEALTH MAINTENANCE PROTOCOL ORDERS,         AST, ALT, CBC with platelets, Basic metabolic         panel, Uric acid            (Z11.4) Screening for HIV (human immunodeficiency virus)  Comment: One time screening test per CDC recommendations.   Plan: REVIEW OF HEALTH MAINTENANCE PROTOCOL ORDERS,         HIV Antigen Antibody Combo            (Z11.59) Need for hepatitis C screening test  Comment: One time screening test per CDC recommendations.   Plan: REVIEW OF HEALTH MAINTENANCE PROTOCOL ORDERS,         Hepatitis C Screen Reflex to HCV RNA Quant and         Genotype            (Z12.11) Screen for colon cancer  Comment: Needs colon cancer screening with colonoscopy at least every 5 years until the age of 80 due to the family history.  He is above average risk for colon cancer given his family history, Cologuard stool test is not an option.  Plan: REVIEW OF HEALTH MAINTENANCE PROTOCOL ORDERS,         Colonoscopy Screening  Referral            (Z12.5) Screening for prostate cancer  Comment: Discussed prostate cancer screening and PSA blood test.  Discussed that an elevated PSA blood test can be caused by things other than prostate cancer, including infection/inflammation, BPH, and recent sexual activity; and that elevated PSA blood test may require further investigation with a urologist   Plan: REVIEW OF HEALTH MAINTENANCE PROTOCOL ORDERS,         PSA, screen            (Z13.6) CARDIOVASCULAR SCREENING; LDL GOAL LESS THAN 100  Comment: Discussed cardiac disease risk factors and cardiac disease risk factor modification.   Plan: REVIEW OF HEALTH MAINTENANCE PROTOCOL ORDERS,         AST, ALT, CBC with platelets, Basic metabolic         panel, CANCELED: Lipid panel reflex to direct         LDL Fasting            (Z23) Need  "for Tdap vaccination  Comment:   Plan: REVIEW OF HEALTH MAINTENANCE PROTOCOL ORDERS            (Z80.0) Family history of colon cancer  Comment: Needs colon cancer screening with colonoscopy at least every 5 years until the age of 80.  Plan:     (F17.200) Tobacco use disorder  Comment: Spoke at length about the detrimental effects of smoking including increased risk of vascular disease (he is already suffered a stroke).  He smokes only a few cigarettes per day and plans to quit.  Will contact me if he requires further assistance.  Plan:     (Z23) High priority for 2019-nCoV vaccine  Comment:   Plan: COVID-19,PF,PFIZER BOOSTER BIVALENT 12+Yrs            (Z23) Need for pneumococcal vaccine  Comment:   Plan: Pneumococcal 20 Valent Conjugate (Prevnar 20)               Patient has been advised of split billing requirements and indicates understanding: Yes    COUNSELING:   Reviewed preventive health counseling, as reflected in patient instructions       Regular exercise       Healthy diet/nutrition       Vision screening       Hearing screening       Immunizations    Vaccinated for: Pneumococcal and COVID-vaccine    Strongly recommend he get the annual influenza vaccine, he wished to defer this because he states that he \"gets sick\" when he has the flu shot.    Recommending Shingrix shingles vaccine series, we can review this at his next visit.           Aspirin prophylaxis        Alcohol Use        Consider Hep C screening for all patients one time for ages 18-79 years       HIV screeninx in teen years, 1x in adult years, and at intervals if high risk       Colorectal cancer screening       Prostate cancer screening    Estimated body mass index is 24.69 kg/m  as calculated from the following:    Height as of this encounter: 1.676 m (5' 6\").    Weight as of this encounter: 69.4 kg (153 lb).         He reports that he has been smoking cigarettes. He has been smoking about 0.25 packs per day. He uses smokeless " "tobacco.  Nicotine/Tobacco Cessation Plan:   Information offered: Patient not interested at this time      Counseling Resources:  ATP IV Guidelines  Pooled Cohorts Equation Calculator  FRAX Risk Assessment  ICSI Preventive Guidelines  Dietary Guidelines for Americans, 2010  USDA's MyPlate  ASA Prophylaxis  Lung CA Screening    Discussed that additional charges may be applied for addressing concerns at today's visit above and beyond what is covered by the \"routine physical exam\" diagnosis code.  Patient verbalized understanding and would like additional concerns addressed today.    Spent greater than 20 minutes above and beyond routine Health Care Maintenence issues on the above mentioned additional problem(s).       Júnior Randall MD  [facility]  "

## 2022-09-19 NOTE — PATIENT INSTRUCTIONS
"  [employer] information    [employer] Username  [id number]     Temporary password ( log in and then change it):  [id number]      HISTORY OF STROKE:     Work on on reducing risk factors.      Resume Atorvastatin 40 mg once per day.  Any patient with prior stroke or heart attack should take a \"statin\" cholesterol lowering medication regardless of their cholesterol levels.    Patients who take these statins have fewer strokes and heart attacks.   This medication is generally well tolerated.  The most common Main side effects are new or worsened muscle aching or intestinal upset.  Stop the medication and contact us if you develop any concern for side effects.       Control blood pressure.       Take the aspirin 81 mg once per day, every day.      STOP SMOKING      FAMILY HISTORY OF COLON CANCER:     Because of your family history of colon cancer ( Father) You need a colonoscopy at least every 5 years until age 80     Colonoscopy at [facility] [phone] (fax [phone])  at your convenience.  (I will send them an order, they should contact you, but you can also call them)      GOUT:     Checking uric acid levels.  If the levels are elevated noticably, you may need a medication to lower the uric acid levels to prevent future gout attacks.          5 GOALS TO PREVENT VASCULAR DISEASE:     1.  Aggressive blood pressure control, under 130/80 ideally.  Using medications if needed.    Your blood pressure is under good control    BP Readings from Last 4 Encounters:   09/19/22 135/82   08/01/22 130/80   05/02/19 118/86   04/23/19 138/80       2.  Aggressive LDL cholesterol (\"bad cholesterol\") lowering as indicated.    Your goal is an LDL under 130 for sure, preferably under 100.  (If you have diabetes or previous vascular disease, the the LDL goals would be under 100 for sure, preferably under 70.)    New guidelines identify four high-risk groups who could benefit from statins:   *people with pre-existing heart " disease, such as those who have had a heart attack;   *people ages 40 to 75 who have diabetes of any type  *patients ages 40 to 75 with at least a 7.5% risk of developing cardiovascular disease over the next decade, according to a formula described in the guidelines  *patients with the sort of super-high cholesterol that sometimes runs in families, as evidenced by an LDL of 190 milligrams per deciliter or higher    Your cholesterol levels are not well controlled.       Take the Atorvastatin every day regardless of your cholesterol levels.     Recent Labs   Lab Test 04/20/19  0820 12/03/15  0953   CHOL 214* 224*   HDL 35* 38*   * 145*   TRIG 201* 204*       3.  Aggressive diabetic prevention, screening and/or management.      You do not have diabetes as of the most recent blood tests.     4.  No smoking    5.  Consider daily preventative aspirin over age 50 if you have enough cardiac risk factors to place you at higher risk for the presence of vascular disease.    If you have any reason not to take aspirin such easy bruising or bleeding, stomach problems, other anticoagulant medications, or any other side effects, then you should not take Aspirin.     --Based on your prior stroke and current cardiac risk factor profile, you should take regular daily Aspirin 81 mg once per day (as long as you do not have any side effects from taking aspirin).                 Preventive Health Recommendations  Male Ages 50 - 64    Yearly exam:             See your health care provider every year in order to  o   Review health changes.   o   Discuss preventive care.    o   Review your medicines if your doctor has prescribed any.   Have a cholesterol test every 5 years, or more frequently if you are at risk for high cholesterol/heart disease.   Have a diabetes test (fasting glucose) every three years. If you are at risk for diabetes, you should have this test more often.   Have a colonoscopy at age 50, or have a yearly FIT test  "(stool test). These exams will check for colon cancer.    Talk with your health care provider about whether or not a prostate cancer screening test (PSA) is right for you.  You should be tested each year for STDs (sexually transmitted diseases), if you re at risk.     Shots: Get a flu shot each year. Get a tetanus shot every 10 years.     Nutrition:  Eat at least 5 servings of fruits and vegetables daily.   Eat whole-grain bread, whole-wheat pasta and brown rice instead of white grains and rice.   Talk to your provider about Calcium and Vitamin D.        --Good Grains:  Oats, brown rice, Quinoa (these do not raise the blood sugar as much)     --Bad grains:  Anything made from wheat or white rice     (because these raise the blood sugars significantly, and the possible gluten issue from wheat for some people).      --Proteins:  Aim for \"lean proteins\" including chicken, fish, seafood, pork, turkey, and eggs (in moderation); Eat red meat only occasionally    Lifestyle  Exercise for at least 150 minutes a week (30 minutes a day, 5 days a week). This will help you control your weight and prevent disease.   Limit alcohol to one drink per day.   No smoking.   Wear sunscreen to prevent skin cancer.   See your dentist every six months for an exam and cleaning.   See your eye doctor every 1 to 2 years.                "

## 2023-01-14 ENCOUNTER — HEALTH MAINTENANCE LETTER (OUTPATIENT)
Age: 53
End: 2023-01-14

## 2023-01-24 ENCOUNTER — TRANSFERRED RECORDS (OUTPATIENT)
Dept: HEALTH INFORMATION MANAGEMENT | Facility: CLINIC | Age: 53
End: 2023-01-24
Payer: COMMERCIAL

## 2023-10-30 ENCOUNTER — MYC REFILL (OUTPATIENT)
Dept: INTERNAL MEDICINE | Facility: CLINIC | Age: 53
End: 2023-10-30
Payer: COMMERCIAL

## 2023-10-30 DIAGNOSIS — I63.50 CEREBROVASCULAR ACCIDENT (CVA) DUE TO STENOSIS OF CEREBRAL ARTERY (H): ICD-10-CM

## 2023-10-30 DIAGNOSIS — E78.5 HYPERLIPIDEMIA LDL GOAL <130: Primary | ICD-10-CM

## 2023-10-30 DIAGNOSIS — Z13.6 CARDIOVASCULAR SCREENING; LDL GOAL LESS THAN 100: ICD-10-CM

## 2023-10-30 DIAGNOSIS — E78.5 HYPERLIPIDEMIA LDL GOAL <100: ICD-10-CM

## 2023-10-30 DIAGNOSIS — M10.9 ACUTE GOUTY ARTHRITIS: ICD-10-CM

## 2023-10-30 ASSESSMENT — ENCOUNTER SYMPTOMS
SORE THROAT: 0
DIZZINESS: 0
EYE PAIN: 0
HEMATOCHEZIA: 0
HEARTBURN: 0
ARTHRALGIAS: 0
FEVER: 0
JOINT SWELLING: 0
NAUSEA: 0
WEAKNESS: 0
MYALGIAS: 0
ABDOMINAL PAIN: 0
FREQUENCY: 0
COUGH: 0
CHILLS: 0
HEMATURIA: 0
PALPITATIONS: 0
HEADACHES: 0
NERVOUS/ANXIOUS: 0
DYSURIA: 0
SHORTNESS OF BREATH: 0
DIARRHEA: 0
PARESTHESIAS: 0
CONSTIPATION: 0

## 2023-10-30 NOTE — COMMUNITY RESOURCES LIST (ENGLISH)
10/30/2023   [facility]  N/A  For questions about this resource list or additional care needs, please contact your primary care clinic or care manager.  Phone: [phone]   Email: N/A   Address: [facility] [place] [address]   Hours: N/A        Hotlines and Helplines       Hotline - Housing crisis  1  [facility] (Main Office) - Emergency Services Distance: 2.06 miles      Phone/Virtual   [address] [place] [address]  Language: English  Hours: Mon - Sun Open 24 Hours   Phone: (411) 616-3249 Email: info@[employer] Website: [email or web address]     2  [facility] Distance: 7.18 miles      Phone/Virtual   [address] [place] [address]  Language: English  Hours: Mon - Sun Open 24 Hours   Phone: (775) 998-1525 Email: info@[employer] Website: [email or web address].[employer].org          Housing       Coordinated Entry access point  3  [facility] [place] ([facility] - Housing Services Distance: 7.41 miles      In-Person   [address] [place] [address]  Language: English  Hours: Mon - Fri 9:00 AM - 5:00 PM  Fees: Free   Phone: (414) 791-8158 Email: housing@[facility].org Website: http://www.[facility].org/housing     4  [facility] Distance: 8.43 miles      In-Person   [address] [place] [address]  Language: English  Hours: Mon - Sat 10:00 PM - 5:00 PM  Fees: Free   Phone: (121) 634-5550 Email: info@[employer].org Website: http://www.[email or web address]/     Drop-in center or day shelter  5  [facility] Distance: 7.79 miles      In-Person   [address] [place] [address]  Language: English  Hours: Mon - Fri 12:00 PM - 3:00 PM  Fees: Free   Phone: (928) 724-7904 Email: [employer] Centercommunity@[employer] Website: http://[employer].org/     6  [facility] [place] Distance: 7.97 miles       In-Person   [address], [place] [address]  Language: English, South Sudanese, Micronesian  Hours: Mon - Sat 7:00 AM - 3:00 PM  Fees: Free, Self Pay   Phone: (515) 550-6183 Email: info@[employer] Website: https://www.[employer].org/locations/opportunity-center/     Housing search assistance  7  [facility] - Rental Homes for Future Homebuyers Program Distance: 2.01 miles      Phone/Virtual   [address] [place] [address]  Language: English  Hours: Mon - Fri 8:00 AM - 4:30 PM  Fees: Free   Phone: (198) 680-4997 Email: hra@[facility] Website: https://www.[facility]/hra/[place]-housing-and-behnaunbbqjya-ghtgskths-maj     8  [facility] Office of Multicultural Services Distance: 7.16 miles      Phone/Virtual   [address] [place] [address]  Language: American Sign Language, Luxembourgish, English, English, Chinese, Thai, Oromo, Hungarian, South Sudanese, Micronesian, Swahili, Mervin, British  Hours: Mon - Tue 9:00 AM - 4:00 PM , Wed 10:00 AM - 5:00 PM , Thu - Fri 9:00 AM - 4:00 PM  Fees: Free   Phone: (388) 697-6784 Email: oms@[employer]. Website: http://www.[facility]/residents/human-services/multi-cultural-services     Shelter for individuals  9  [facility] Distance: 7.56 miles      In-Person   [address] [place] [address]  Language: English  Hours: Mon - Sun Open 24 Hours  Fees: Free   Phone: (117) 824-2313 Email: communications@[facility].org Website: https://[facility].org/oursaviourshousing/     10  [facility] Distance: 8.62 miles      In-Person   [address] [place] [address]  Language: English  Hours: Mon - Fri 4:00 PM - 9:00 AM  Fees: Free   Phone: (762) 519-7516 Email: latosha@[facility].org Website: https://[facility].org/northern/[facility]/          Important Numbers & Websites       Emergency Services   911  [facility] Services   311  Poison  Control   (262) 528-4256  Suicide Prevention Lifeline   (757) 492-3252 (TALK)  Child Abuse Hotline   (976) 946-1515 (4-A-Child)  Sexual Assault Hotline   (219) 232-1202 (HOPE)  National Runaway Safeline   (453) 554-8989 (RUNAWAY)  All-Options Talkline   (804) 808-5972  Substance Abuse Referral   (389) 431-4190 (HELP)

## 2023-11-01 RX ORDER — ATORVASTATIN CALCIUM 40 MG/1
40 TABLET, FILM COATED ORAL DAILY
Qty: 90 TABLET | Refills: 0 | Status: SHIPPED | OUTPATIENT
Start: 2023-11-01 | End: 2023-11-06

## 2023-11-06 ENCOUNTER — OFFICE VISIT (OUTPATIENT)
Dept: INTERNAL MEDICINE | Facility: CLINIC | Age: 53
End: 2023-11-06
Payer: COMMERCIAL

## 2023-11-06 VITALS
BODY MASS INDEX: 27.2 KG/M2 | SYSTOLIC BLOOD PRESSURE: 134 MMHG | HEART RATE: 79 BPM | TEMPERATURE: 97.7 F | HEIGHT: 64 IN | OXYGEN SATURATION: 100 % | WEIGHT: 159.3 LBS | DIASTOLIC BLOOD PRESSURE: 82 MMHG

## 2023-11-06 DIAGNOSIS — Z80.0 FAMILY HISTORY OF COLON CANCER: ICD-10-CM

## 2023-11-06 DIAGNOSIS — Z23 NEED FOR PROPHYLACTIC VACCINATION AND INOCULATION AGAINST INFLUENZA: ICD-10-CM

## 2023-11-06 DIAGNOSIS — F17.200 TOBACCO USE DISORDER: ICD-10-CM

## 2023-11-06 DIAGNOSIS — I63.50 CEREBROVASCULAR ACCIDENT (CVA) DUE TO STENOSIS OF CEREBRAL ARTERY (H): ICD-10-CM

## 2023-11-06 DIAGNOSIS — E78.5 HYPERLIPIDEMIA LDL GOAL <130: ICD-10-CM

## 2023-11-06 DIAGNOSIS — R73.09 ELEVATED GLUCOSE: ICD-10-CM

## 2023-11-06 DIAGNOSIS — E78.5 HYPERLIPIDEMIA LDL GOAL <100: ICD-10-CM

## 2023-11-06 DIAGNOSIS — Z12.5 SCREENING FOR PROSTATE CANCER: ICD-10-CM

## 2023-11-06 DIAGNOSIS — Z13.6 CARDIOVASCULAR SCREENING; LDL GOAL LESS THAN 130: ICD-10-CM

## 2023-11-06 DIAGNOSIS — M10.9 ACUTE GOUTY ARTHRITIS: ICD-10-CM

## 2023-11-06 DIAGNOSIS — Z13.9 ENCOUNTER FOR SCREENING INVOLVING SOCIAL DETERMINANTS OF HEALTH (SDOH): ICD-10-CM

## 2023-11-06 DIAGNOSIS — Z23 HIGH PRIORITY FOR COVID-19 VACCINATION: ICD-10-CM

## 2023-11-06 DIAGNOSIS — Z00.00 ROUTINE GENERAL MEDICAL EXAMINATION AT A HEALTH CARE FACILITY: Primary | ICD-10-CM

## 2023-11-06 LAB
ALT SERPL W P-5'-P-CCNC: 43 U/L (ref 0–70)
ANION GAP SERPL CALCULATED.3IONS-SCNC: 10 MMOL/L (ref 7–15)
AST SERPL W P-5'-P-CCNC: 22 U/L (ref 0–45)
BUN SERPL-MCNC: 15.2 MG/DL (ref 6–20)
CALCIUM SERPL-MCNC: 9.1 MG/DL (ref 8.6–10)
CHLORIDE SERPL-SCNC: 107 MMOL/L (ref 98–107)
CHOLEST SERPL-MCNC: 170 MG/DL
CREAT SERPL-MCNC: 0.8 MG/DL (ref 0.67–1.17)
DEPRECATED HCO3 PLAS-SCNC: 23 MMOL/L (ref 22–29)
EGFRCR SERPLBLD CKD-EPI 2021: >90 ML/MIN/1.73M2
ERYTHROCYTE [DISTWIDTH] IN BLOOD BY AUTOMATED COUNT: 11.8 % (ref 10–15)
GLUCOSE SERPL-MCNC: 106 MG/DL (ref 70–99)
HCT VFR BLD AUTO: 47.5 % (ref 40–53)
HDLC SERPL-MCNC: 41 MG/DL
HGB BLD-MCNC: 15.7 G/DL (ref 13.3–17.7)
LDLC SERPL CALC-MCNC: 94 MG/DL
MCH RBC QN AUTO: 29.5 PG (ref 26.5–33)
MCHC RBC AUTO-ENTMCNC: 33.1 G/DL (ref 31.5–36.5)
MCV RBC AUTO: 89 FL (ref 78–100)
NONHDLC SERPL-MCNC: 129 MG/DL
PLATELET # BLD AUTO: 229 10E3/UL (ref 150–450)
POTASSIUM SERPL-SCNC: 4.5 MMOL/L (ref 3.4–5.3)
PSA SERPL DL<=0.01 NG/ML-MCNC: 0.21 NG/ML (ref 0–3.5)
RBC # BLD AUTO: 5.33 10E6/UL (ref 4.4–5.9)
SODIUM SERPL-SCNC: 140 MMOL/L (ref 135–145)
TRIGL SERPL-MCNC: 175 MG/DL
URATE SERPL-MCNC: 6.1 MG/DL (ref 3.4–7)
WBC # BLD AUTO: 6.4 10E3/UL (ref 4–11)

## 2023-11-06 PROCEDURE — 84460 ALANINE AMINO (ALT) (SGPT): CPT | Performed by: INTERNAL MEDICINE

## 2023-11-06 PROCEDURE — 99396 PREV VISIT EST AGE 40-64: CPT | Mod: 25 | Performed by: INTERNAL MEDICINE

## 2023-11-06 PROCEDURE — 90480 ADMN SARSCOV2 VAC 1/ONLY CMP: CPT | Performed by: INTERNAL MEDICINE

## 2023-11-06 PROCEDURE — 84550 ASSAY OF BLOOD/URIC ACID: CPT | Performed by: INTERNAL MEDICINE

## 2023-11-06 PROCEDURE — 84450 TRANSFERASE (AST) (SGOT): CPT | Performed by: INTERNAL MEDICINE

## 2023-11-06 PROCEDURE — 90682 RIV4 VACC RECOMBINANT DNA IM: CPT | Performed by: INTERNAL MEDICINE

## 2023-11-06 PROCEDURE — 80048 BASIC METABOLIC PNL TOTAL CA: CPT | Performed by: INTERNAL MEDICINE

## 2023-11-06 PROCEDURE — 36415 COLL VENOUS BLD VENIPUNCTURE: CPT | Performed by: INTERNAL MEDICINE

## 2023-11-06 PROCEDURE — 91320 SARSCV2 VAC 30MCG TRS-SUC IM: CPT | Performed by: INTERNAL MEDICINE

## 2023-11-06 PROCEDURE — G0103 PSA SCREENING: HCPCS | Performed by: INTERNAL MEDICINE

## 2023-11-06 PROCEDURE — 85027 COMPLETE CBC AUTOMATED: CPT | Performed by: INTERNAL MEDICINE

## 2023-11-06 PROCEDURE — 90471 IMMUNIZATION ADMIN: CPT | Performed by: INTERNAL MEDICINE

## 2023-11-06 PROCEDURE — 99214 OFFICE O/P EST MOD 30 MIN: CPT | Mod: 25 | Performed by: INTERNAL MEDICINE

## 2023-11-06 PROCEDURE — 80061 LIPID PANEL: CPT | Performed by: INTERNAL MEDICINE

## 2023-11-06 RX ORDER — ASPIRIN 81 MG/1
81 TABLET, CHEWABLE ORAL DAILY
COMMUNITY
Start: 2023-11-06

## 2023-11-06 RX ORDER — ATORVASTATIN CALCIUM 40 MG/1
40 TABLET, FILM COATED ORAL DAILY
Qty: 90 TABLET | Refills: 3 | Status: SHIPPED | OUTPATIENT
Start: 2023-11-06

## 2023-11-06 ASSESSMENT — ENCOUNTER SYMPTOMS
DYSURIA: 0
SHORTNESS OF BREATH: 0
ARTHRALGIAS: 0
DIARRHEA: 0
JOINT SWELLING: 0
FREQUENCY: 0
NERVOUS/ANXIOUS: 0
DIZZINESS: 0
SORE THROAT: 0
PALPITATIONS: 0
PARESTHESIAS: 0
MYALGIAS: 0
EYE PAIN: 0
WEAKNESS: 0
COUGH: 0
HEARTBURN: 0
HEMATURIA: 0
HEADACHES: 0
CONSTIPATION: 0
ABDOMINAL PAIN: 0
NAUSEA: 0
CHILLS: 0
FEVER: 0
HEMATOCHEZIA: 0

## 2023-11-06 ASSESSMENT — PAIN SCALES - GENERAL: PAINLEVEL: NO PAIN (0)

## 2023-11-06 NOTE — COMMUNITY RESOURCES LIST (ENGLISH)
11/06/2023   [facility]  [facility]A  For questions about this resource list or additional care needs, please contact your primary care clinic or care manager.  Phone: [phone]   Email: N/A   Address: [facility] [place] [address]   Hours: N/A        Hotlines and Helplines       Hotline - Housing crisis  1  [facility] (Main Office) - Emergency Services Distance: 2.06 miles      Phone/Virtual   [address] [place] [address]  Language: English  Hours: Mon - Sun Open 24 Hours   Phone: (973) 652-6177 Email: info@[employer].Pipette Website: http://PubGame.Pipette     2  [facility] Distance: 7.18 miles      Phone/Virtual   [address] [place] [address]  Language: English  Hours: Mon - Sun Open 24 Hours   Phone: (721) 823-7286 Email: info@[employer].Pipette Website: http://www.PubGame.org          Housing       Coordinated Entry access point  3  [facility] [facility] [place] ([facility] - Housing Services Distance: 7.41 miles      In-Person   [address] [place] [address]  Language: English  Hours: Mon - Fri 9:00 AM - 5:00 PM  Fees: Free   Phone: (500) 381-7413 Email: housing@[facility].org Website: http://www.[facility].org/housing     4  [facility] - Adult skilled nursing [facility] Distance: 8.43 miles      In-Person   [address] [place] [address]  Language: English  Hours: Mon - Sat 10:00 PM - 5:00 PM  Fees: Free   Phone: (758) 271-2832 Email: info@[facility].org Website: http://www.[facility].org/     Drop-in center or day shelter  5  [facility] Distance: 7.79 miles      In-Person   [address] [place] [address]  Language: English  Hours: Mon - Fri 12:00 PM - 3:00 PM  Fees: Free   Phone: (274) 226-3617 Email: Otonomycommunity@[employer] Website: http://HZO.org/     6  [facility] Distance: 7.97 miles       In-Person   [address], [place] [address]  Language: English, American, Mosotho  Hours: Mon - Sat 7:00 AM - 3:00 PM  Fees: Free, Self Pay   Phone: (518) 217-8864 Email: info@[employer] Website: https://www.[employer].org/locations/opportunity-center/     Housing search assistance  7  [facility] - Rental Homes for Future Homebuyers Program Distance: 2.01 miles      Phone/Virtual   [address] [place] [address]  Language: English  Hours: Mon - Fri 8:00 AM - 4:30 PM  Fees: Free   Phone: (317) 569-5683 Email: hra@[facility] Website: https://www.[facility]/hra/[place]-housing-and-wlhjwxoggnivj-eklfxjayr-ybs     8  [facility] of Multicultural Services Distance: 7.16 miles      Phone/Virtual   [address] [place] [address]  Language: American Sign Language, Greenlandic, Indonesian, English, Wolof, Nepalese, Oromo, Montserratian, American, Mosotho, Swahili, Mervin, Burkinan  Hours: Mon - Tue 9:00 AM - 4:00 PM , Wed 10:00 AM - 5:00 PM , Thu - Fri 9:00 AM - 4:00 PM  Fees: Free   Phone: (148) 754-1734 Email: oms@[place]. Website: http://www.[facility]/residents/human-services/multi-cultural-services     Shelter for individuals  9  [facility] Distance: 7.56 miles      In-Person   [address] [place] [address]  Language: English  Hours: Mon - Sun Open 24 Hours  Fees: Free   Phone: (429) 999-6610 Email: communications@[facility].org Website: https://[facility]s-[place].org/oursaviourshousing/     10  [facility] Distance: 8.62 miles      In-Person   [address] [place] [address]  Language: English  Hours: Mon - Fri 4:00 PM - 9:00 AM  Fees: Free   Phone: (772) 182-4657 Email: latosha@[facility].org Website: https://[facility]al Hospitalation[facility]y.org/northern/[facility]/          Important Numbers & Websites       Emergency Services   911  [facility] Services   311  Poison  Control   (984) 412-5546  Suicide Prevention Lifeline   (276) 795-6607 (TALK)  Child Abuse Hotline   (580) 486-6517 (4-A-Child)  Sexual Assault Hotline   (342) 919-8790 (HOPE)  National Runaway Safeline   (116) 434-6437 (RUNAWAY)  All-Options Talkline   (622) 696-1970  Substance Abuse Referral   (396) 368-1471 (HELP)

## 2023-11-06 NOTE — PATIENT INSTRUCTIONS
"   Covid and flu shots given today.      Get a tetanus vaccine at your convenience from the pharmacy at one of your next visits there.      Also consider the Shingrix shingles vaccine series at your convenience.  Get this from the pharmacist in the pharmacy.      Rechecking the uric acid level, if again elevated, this places you at higher risk for future gout attacks and you would benefit from a medicaito to lower uric acid levels.  I would start allopurinol 100 mg once per day if the uric acid levels are elevated.           5 GOALS TO PREVENT VASCULAR DISEASE:     1.  Aggressive blood pressure control, under 130/80 ideally.  Using medications if needed.    Your blood pressure is under good control    BP Readings from Last 4 Encounters:   11/06/23 134/82   09/19/22 135/82   08/01/22 130/80   05/02/19 118/86       2.  Aggressive LDL cholesterol (\"bad cholesterol\") lowering as indicated.    Your goal is an LDL under 130 for sure, preferably under 100.  (If you have diabetes or previous vascular disease, the the LDL goals would be under 100 for sure, preferably under 70.)    New guidelines identify four high-risk groups who could benefit from statins:   *people with pre-existing heart disease, such as those who have had a heart attack;   *people ages 40 to 75 who have diabetes of any type  *patients ages 40 to 75 with at least a 7.5% risk of developing cardiovascular disease over the next decade, according to a formula described in the guidelines  *patients with the sort of super-high cholesterol that sometimes runs in families, as evidenced by an LDL of 190 milligrams per deciliter or higher    Your cholesterol levels are well controlled.    Recent Labs   Lab Test 09/19/22  1011 04/20/19  0820   CHOL 237* 214*   HDL 37* 35*   * 139*   TRIG 422* 201*       3.  Aggressive diabetic prevention, screening and/or management.      You do not have diabetes as of the most recent blood tests.     4.  No smoking    5.  " Consider daily preventative aspirin over age 50 if you have enough cardiac risk factors to place you at higher risk for the presence of vascular disease.    If you have any reason not to take aspirin such easy bruising or bleeding, stomach problems, other anticoagulant medications, or any other side effects, then you should not take Aspirin.     --Based on your current cardiac risk factor profile, you should take regular daily Aspirin 81 mg once per day (as long as you do not have any side effects from taking aspirin).             Preventive Health Recommendations  Male Ages 45 - 64    Yearly exam:             See your health care provider every year in order to  o   Review health changes.   o   Discuss preventive care.    o   Review your medicines if your doctor has prescribed any.   Continue to review and reassess the management of any ongoing chronic medical conditions  Have a cholesterol test every 5 years, or more frequently if you are at risk for high cholesterol/heart disease.   Have a diabetes test (fasting glucose) at least every three years. If you are at risk for diabetes, you should have this test more often.   Regular colon cancer screening starting age 45 with either a colonoscopy, or stool test (either Cologuard every 3 years or yearly FIT stool test from ).  The intervals for colon cancer screening will be determined by family history and prior colon cancer screening results.   Routine prostate cancer screening with a PSA blood test every 1-2 years.   While the PSA blood test is not a direct cancer screening blood test, it detects inflammation within in the prostate, which could indicate possible cancer.  If the test is abnormal, you do not necessarily have prostate cancer, but would need further evaluation.    You should be tested each year for STDs (sexually transmitted diseases), if you re at risk.     Shots:   Get a flu shot each year.   Covid vaccines are now recommended annually.  Get the  "most updated Covid vaccine when it becomes available, consider getting this at the same time as the annual influenza vaccine.   Get a tetanus shot every 10 years.  Everyone over age 50 should strongly consider the Shingrix shingles vaccine to give you the best chance of avoiding future shingles infection (as many as 1 and 3 adults over age 50 may develop this condition in their lifetime).    Investigate the cost and coverage for Shingrix shingles vaccines with a pharmacist at a pharmacy.  They can tell you the coverage and cost and then give it to you if the price is acceptable.    In case your insurance does not cover a Shingrix shingles vaccine, it is cheaper to receive this shingles vaccine from a pharmacist in a pharmacy rather than in our clinic.    At this time, you only need the 2 Shingrix vaccines and then you are done.       Nutrition:  Eat at least 5 servings of fruits and vegetables daily.   Eat whole-grain bread, whole-wheat pasta and brown rice instead of white grains and rice.   Talk to your provider about Calcium and Vitamin D.        --Good Grains:  Oats, brown rice, Quinoa (these do not raise the blood sugar as much)     --Bad grains:  Anything made from wheat or white rice     (because these raise the blood sugars significantly, and the possible gluten issue from wheat for some people).      --Proteins:  Aim for \"lean proteins\" including chicken, fish, seafood, pork, turkey, and eggs (in moderation); Eat red meat only occasionally    Lifestyle  Exercise for at least 150 minutes a week (30 minutes a day, 5 days a week). This will help you control your weight and prevent disease.   Limit alcohol to one drink per day.   No smoking.   Wear sunscreen to prevent skin cancer.   See your dentist every six months for an exam and cleaning.   See your eye doctor every 1 to 2 years.      No smoking.   Wear sunscreen to prevent skin cancer.   See your dentist every six months for an exam and cleaning.   See " your eye doctor every 1 to 2 years.

## 2023-11-06 NOTE — COMMUNITY RESOURCES LIST (ENGLISH)
11/06/2023   [facility] - Outpatient Clinics  N/A  For additional resource needs, please contact your health insurance member services or your primary care team.  Phone: [phone]   Email: N/A   Address: [address] [place] [address]   Hours: N/A        Hotlines and Helplines       Hotline - Housing crisis  1  [facility] (Main Office) - Emergency Services Distance: 2.06 miles      Phone/Virtual   [address] [place] [address]  Language: English  Hours: Mon - Sun Open 24 Hours   Phone: (705) 948-5684 Email: info@[employer] Website: http://[employer].TuVox     2  [facility] Distance: 7.18 miles      Phone/Virtual   [address] [place] [address]  Language: English  Hours: Mon - Sun Open 24 Hours   Phone: (361) 598-6815 Email: info@[employer] Website: http://www.[employer].org          Housing       Coordinated Entry access point  3  [facility] [place] ([facility]) - Housing Services Distance: 7.41 miles      In-Person   [address] [place] [address]  Language: English  Hours: Mon - Fri 9:00 AM - 5:00 PM  Fees: Free   Phone: (174) 481-8428 Email: housing@[facility].org Website: http://www.[facility].org/housing     4  [facility] - Adult senior care [facility] Distance: 8.43 miles      In-Person   [address] [place] [address]  Language: English  Hours: Mon - Sat 10:00 PM - 5:00 PM  Fees: Free   Phone: (479) 882-1571 Email: info@saintola.[employer]ox Website: http://www.[facility].org/     Drop-in center or day shelter  5  [facility] Distance: 7.79 miles      In-Person   [address] [place] [address]  Language: English  Hours: Mon - Fri 12:00 PM - 3:00 PM  Fees: Free   Phone: (774) 702-1711 Email: ConfoviscommunAviate@[employer]iana Website: http://Motwin.org/     6  [facility] Distance: 7.97 miles       In-Person   [address] [place] [address]  Language: English, Burundian, Salvadorean  Hours: Mon - Sat 7:00 AM - 3:00 PM  Fees: Free, Self Pay   Phone: (753) 197-1590 Email: info@[employer] Website: https://www.[employer]/locations/opportunity-center/     Housing search assistance  7  Affordable Housing Online - https://R&V/ Distance: 8.67 miles      Phone/Virtual   [address] [place] [address]  Language: English  Hours: Mon - Sun Open 24 Hours   Email: info@[employer] Website: https://R&V     8  [employer] - Online housing search assistance Distance: 8.7 miles      Phone/Virtual   [address] [address]  Language: English, Hmong, Burundian, Salvadorean  Hours: Mon - Sun Open 24 Hours   Phone: (705) 118-4187 Email: info@housinglink.org Website: http://www.CanoP.org/     Shelter for individuals  9  [facility] Distance: 7.56 miles      In-Person   [address] [place] [address]  Language: English  Hours: Mon - Sun Open 24 Hours  Fees: Free   Phone: (620) 537-6894 Email: communications@[facility].org Website: https://[facility].org/oursaviourshousing/     10  [facility] Distance: 8.62 miles      In-Person   [address] [place] [address]  Language: English  Hours: Mon - Fri 4:00 PM - 9:00 AM  Fees: Free   Phone: (766) 486-7471 Email: latosha@[facility].salvationarmy.org Website: https://[facility].salvationarmy.org/[facility]/          Important Numbers & Websites       [facility]   [address] [facility].org  Poison Control   (194) 791-7320 [place]po[place]  Suicide and Crisis Lifeline   [address] [place], [place] [address]line.org  Childhelp National Child Abuse Hotline   884.810.8144 Childhelphotline.org  National Sexual Assault Hotline   (976) 852-5403 (HOPE) Rainn.org  National Runaway Safeline   (569) 390-3082 (RUNAWAY) 1800naway.org  Pregnancy & Postpartum Support [place]    Call/text 561-986-0672 Ppsupportmn.org  Substance Abuse National Helpline ([facility]   791-079-HELP (6393) Findtreatment.gov  Emergency Services   917

## 2023-11-06 NOTE — PROGRESS NOTES
"SUBJECTIVE:   CC: Arsenio is an 52 year old who presents for preventative health visit.       11/6/2023     9:09 AM   Additional Questions   Roomed by Jordana ACUNA CMA       Healthy Habits:     Getting at least 3 servings of Calcium per day:  Yes    Bi-annual eye exam:  Yes    Dental care twice a year:  Yes    Sleep apnea or symptoms of sleep apnea:  Excessive snoring    Diet:  Regular (no restrictions)    Frequency of exercise:  None    Taking medications regularly:  Yes    Barriers to taking medications:  None    Medication side effects:  None    Today's PHQ-2 Score:       11/6/2023     9:02 AM   PHQ-2 ( 1999 [employer])   Q1: Little interest or pleasure in doing things 0   Q2: Feeling down, depressed or hopeless 0   PHQ-2 Score 0   Q1: Little interest or pleasure in doing things Not at all   Q2: Feeling down, depressed or hopeless Not at all   PHQ-2 Score 0       1.)  prior stroke in 2019 with very minimal residual left upper ext wekaness, he feels \"about 95% recovered\"  No new neurologicl sx.     2.  Hyperlipidemia:  Has history of hyperlipidemia.    The patient is taking a medication for this.  Denies any significant side effects from his medication.      Latest labs reviewed:    Recent Labs   Lab Test 09/19/22  1011 04/20/19  0820   CHOL 237* 214*   HDL 37* 35*   * 139*   TRIG 422* 201*        Lab Results   Component Value Date    AST 17 09/19/2022         3.  history of prior gout attakcs, 2-03 in lifetime.   Uric acid levels minimally elevated.       Social History     Tobacco Use    Smoking status: Every Day     Packs/day: .25     Types: Cigarettes    Smokeless tobacco: Current   Substance Use Topics    Alcohol use: No         10/30/2023     9:26 AM   Alcohol Use   Prescreen: >3 drinks/day or >7 drinks/week? No       Last PSA:   Prostate Specific Antigen Screen   Date Value Ref Range Status   09/19/2022 0.23 0.00 - 4.00 ug/L Final       Reviewed orders with patient. Reviewed health maintenance and updated " orders accordingly - Yes      Reviewed and updated as needed this visit by clinical staff   Tobacco  Allergies  Meds              Reviewed and updated as needed this visit by Provider                   **I reviewed the information recorded in the patient's EPIC chart (including but not limited to medical history, surgical history, family history, problem list, medication list, and allergy list) and updated the information as indicated based on the patients reported information.       Review of Systems   Constitutional:  Negative for chills and fever.   HENT:  Negative for congestion, ear pain, hearing loss and sore throat.    Eyes:  Negative for pain and visual disturbance.   Respiratory:  Negative for cough and shortness of breath.    Cardiovascular:  Negative for chest pain, palpitations and peripheral edema.   Gastrointestinal:  Negative for abdominal pain, constipation, diarrhea, heartburn, hematochezia and nausea.   Genitourinary:  Negative for dysuria, frequency, genital sores, hematuria, impotence, penile discharge and urgency.   Musculoskeletal:  Negative for arthralgias, joint swelling and myalgias.   Skin:  Negative for rash.   Neurological:  Negative for dizziness, weakness, headaches and paresthesias.   Psychiatric/Behavioral:  Negative for mood changes. The patient is not nervous/anxious.      CONSTITUTIONAL: NEGATIVE for fever, chills, change in weight  INTEGUMENTARY/SKIN: NEGATIVE for worrisome rashes, moles or lesions  EYES: NEGATIVE for vision changes or irritation  ENT: NEGATIVE for ear, mouth and throat problems  RESP: NEGATIVE for significant cough or SOB  CV: NEGATIVE for chest pain, palpitations or peripheral edema  GI: NEGATIVE for nausea, abdominal pain, heartburn, or change in bowel habits   male: negative for dysuria, hematuria, decreased urinary stream, erectile dysfunction, urethral discharge  MUSCULOSKELETAL: NEGATIVE for significant arthralgias or myalgia  NEURO: NEGATIVE for  weakness, dizziness or paresthesias  PSYCHIATRIC: NEGATIVE for changes in mood or affect    OBJECTIVE:   There were no vitals taken for this visit.    Physical Exam    GENERAL alert and no distress  EYES:  Normal sclera,conjunctiva, EOMI  HENT: oral and posterior pharynx without lesions or erythema, facies symmetric  NECK: Neck supple. No LAD, without thyroidmegaly.  RESP: Clear to ausculation bilaterally without wheezes or crackles. Normal BS in all fields.  CV: RRR normal S1S2 without murmurs, rubs or gallops.  LYMPH: no cervical lymph adenopathy appreciated  MS: extremities- no gross deformities of the visible extremities noted,   EXT:  no lower extremity edema  PSYCH: Alert and oriented times 3; speech- coherent  SKIN:  No obvious significant skin lesions on visible portions of face     Diagnostic Test Results:  Labs reviewed in Epic    ASSESSMENT/PLAN:     (Z00.00) Routine general medical examination at a health care facility  (primary encounter diagnosis)  Comment: Discussed cardiac disease risk factor modification including screening, preventing, and treating hypertension, elevated lipids, diabetes, and smoking cessation.    Discussed age appropriate cancer screening recommendations as dictated by age group and past medical history.    Recommended making better food choices as often as possible, including lower carb, lower fat, lower salt diet and moderation in any alcohol intake.    Recommended maintaining regular physical activity/exercise throughout their lifetime.  Recommended safety and injury prevention (i.e. seatbelt use, safety equipment like helmets when biking, etc).       Plan: PRIMARY CARE FOLLOW-UP SCHEDULING, REVIEW OF         HEALTH MAINTENANCE PROTOCOL ORDERS            (Z13.9) Encounter for screening involving social determinants of health (SDoH)  Comment:   Plan: PRIMARY CARE FOLLOW-UP SCHEDULING, REVIEW OF         HEALTH MAINTENANCE PROTOCOL ORDERS            (E78.5) Hyperlipidemia LDL goal  <130  Comment: This condition is currently controlled on the current medical regimen.  Continue current therapy.   Discussed guidelines recommending a statin cholesterol lowering medication for any patient with either diabetes and/or vascular disease, aiming for a LDL goal under 100 for sure, ideally under 70, using whatever dose of statin tolerated.  Plan: Lipid panel reflex to direct LDL Non-fasting,         Uric acid, CBC with platelets, Basic metabolic         panel, AST, ALT, PRIMARY CARE FOLLOW-UP         SCHEDULING, REVIEW OF HEALTH MAINTENANCE         PROTOCOL ORDERS, aspirin (ASA) 81 MG chewable         tablet            (M10.9) Acute gouty arthritis  Comment: recheck uric acid, start allopurinol is uric acid again eleavted to prevent future gout attacks.   Drink more fluids.   Plan: Uric acid, CBC with platelets, Basic metabolic         panel, PRIMARY CARE FOLLOW-UP SCHEDULING,         REVIEW OF HEALTH MAINTENANCE PROTOCOL ORDERS            (I63.50) Cerebrovascular accident (CVA) due to stenosis of cerebral artery (H)  Comment: This condition is currently controlled on the current medical regimen.  Continue current therapy. No new sx.   Discussed secondary risk factor modification and recommended continuing aggressive management of these items.   Plan: Lipid panel reflex to direct LDL Non-fasting,         CBC with platelets, Basic metabolic panel,         atorvastatin (LIPITOR) 40 MG tablet, PRIMARY         CARE FOLLOW-UP SCHEDULING, REVIEW OF HEALTH         MAINTENANCE PROTOCOL ORDERS            (Z12.5) Screening for prostate cancer  Comment: Discussed prostate cancer screening and PSA blood test.  Discussed that an elevated PSA blood test can be caused by things other than prostate cancer, including infection/inflammation, BPH, and recent sexual activity; and that elevated PSA blood test may require further investigation with a urologist   Plan: PSA, screen, PRIMARY CARE FOLLOW-UP SCHEDULING,         REVIEW OF HEALTH MAINTENANCE PROTOCOL ORDERS            (Z13.6) CARDIOVASCULAR SCREENING; LDL GOAL LESS THAN 130  Comment: Discussed cardiac disease risk factors and cardiac disease risk factor modification. Plan: Lipid panel reflex to direct LDL Non-fasting,         CBC with platelets, Basic metabolic panel, AST,        ALT, PRIMARY CARE FOLLOW-UP SCHEDULING, REVIEW         OF HEALTH MAINTENANCE PROTOCOL ORDERS            (R73.09) Elevated glucose  Comment:   Plan: Lipid panel reflex to direct LDL Non-fasting,         Basic metabolic panel, AST, ALT, PRIMARY CARE         FOLLOW-UP SCHEDULING, REVIEW OF HEALTH         MAINTENANCE PROTOCOL ORDERS            (E78.5) Hyperlipidemia LDL goal <100  Comment: This condition is currently controlled on the current medical regimen.  Continue current therapy.   Discussed guidelines recommending a statin cholesterol lowering medication for any patient with either diabetes and/or vascular disease, aiming for a LDL goal under 100 for sure, ideally under 70, using whatever dose of statin tolerated.    Plan: atorvastatin (LIPITOR) 40 MG tablet, PRIMARY         CARE FOLLOW-UP SCHEDULING, REVIEW OF HEALTH         MAINTENANCE PROTOCOL ORDERS            (Z23) Need for prophylactic vaccination and inoculation against influenza  Comment:   Plan: PRIMARY CARE FOLLOW-UP SCHEDULING, REVIEW OF         HEALTH MAINTENANCE PROTOCOL ORDERS            (Z23) High priority for COVID-19 vaccination  Comment:   Plan: COVID-19 12+ (2023-24) (PFIZER), PRIMARY CARE         FOLLOW-UP SCHEDULING, REVIEW OF HEALTH         MAINTENANCE PROTOCOL ORDERS            (Z80.0) Family history of colon cancer  Comment:   Plan:     (F17.200) Tobacco use disorder  Comment:   Plan:        Patient has been advised of split billing requirements and indicates understanding: Yes      COUNSELING:   Reviewed preventive health counseling, as reflected in patient instructions       Regular exercise       Healthy  diet/nutrition       Vision screening       Hearing screening       Immunizations  Vaccinated for: Covid-19 and Influenza  (prioritizing these today because of the curretn season)  Get tetanus vaccine at [facility] from the pharmacy           Aspirin prophylaxis        Colorectal cancer screening        He reports that he has been smoking cigarettes. He has been smoking an average of .25 packs per day. He uses smokeless tobacco.  Nicotine/Tobacco Cessation Plan:   Information offered: Patient not interested at this time          Júnior Randall MD  [facility]

## 2024-01-13 ENCOUNTER — OFFICE VISIT (OUTPATIENT)
Dept: URGENT CARE | Facility: URGENT CARE | Age: 54
End: 2024-01-13
Payer: COMMERCIAL

## 2024-01-13 VITALS
SYSTOLIC BLOOD PRESSURE: 159 MMHG | HEART RATE: 74 BPM | OXYGEN SATURATION: 98 % | RESPIRATION RATE: 18 BRPM | WEIGHT: 158 LBS | DIASTOLIC BLOOD PRESSURE: 99 MMHG | BODY MASS INDEX: 27.12 KG/M2 | TEMPERATURE: 97.8 F

## 2024-01-13 DIAGNOSIS — M10.9 ACUTE GOUTY ARTHRITIS: Primary | ICD-10-CM

## 2024-01-13 PROCEDURE — 99213 OFFICE O/P EST LOW 20 MIN: CPT | Performed by: PHYSICIAN ASSISTANT

## 2024-01-13 RX ORDER — INDOMETHACIN 50 MG/1
50 CAPSULE ORAL 2 TIMES DAILY WITH MEALS
Qty: 14 CAPSULE | Refills: 0 | Status: SHIPPED | OUTPATIENT
Start: 2024-01-13 | End: 2024-01-20

## 2024-01-13 ASSESSMENT — ENCOUNTER SYMPTOMS: JOINT SWELLING: 1

## 2024-01-13 NOTE — PROGRESS NOTES
Assessment & Plan     1. Acute gouty arthritis    -Patient has a history of CVA, advised patient that this medication increases risks of stroke. Patient insisted that he only wants treatment with indomethacin because the treatment worked one year ago. Patient understands the risks of the medication and wants to continue with treatment.   -Patient requesting narcotics for pain. Narcotics treatment declined  - indomethacin (INDOCIN) 50 MG capsule; Take 1 capsule (50 mg) by mouth 2 times daily (with meals) for 7 days  Dispense: 14 capsule; Refill: 0       Patient Instructions       The following can minimize chances of gout flare ups    Losing weight if overweight    Cut down on the following food and drinks:  Red meat and seafood, alcohol, sodas and sugary snacks      Return if symptoms worsen or fail to improve, for Follow up.    At the end of the encounter, I discussed results, diagnosis, medications. Discussed red flags for immediate return to clinic/ER, as well as indications for follow up if no improvement. Patient understood and agreed to plan. Patient was stable for discharge.    Nir Cesar is a 53 year old male who presents to clinic today for the following health issues:  Chief Complaint   Patient presents with    Musculoskeletal Problem     Possible gout on the right big toe. Patient have a hx of gout at the same spot      HPI    Patient reports right big toe pain, swelling and tenderness x one week. Patient has  history of gout. Last flare up was one year ago. He was treated with indomethacin 50.mg TID x 7 days. Uric acid, 2 months ago was normal,  6.1. Patient is requesting same treatment. He is also requesting narcotics for pain. Denies loss of sensation    Review of Systems   Musculoskeletal:  Positive for joint swelling.       Problem List:  2022-09: Acute gouty arthritis  2019-04: Cerebrovascular accident (CVA) due to stenosis of cerebral   artery (H)  2015-12: Eczema, unspecified  eczema  2015-12: Impaired fasting glucose  2015-11: Hyperlipidemia LDL goal <130  2015-11: Tobacco use disorder  2015-11: Family history of colon cancer      Past Medical History:   Diagnosis Date    Cerebrovascular accident (CVA) due to stenosis of cerebral artery (H) 04/19/2019    left upper extremity weakness with slurred speech and left facial droop, consistent with CVA. CTA shows mdoderate short segment stenosis involving the  left internal carotid artery. Unremarkable CTA of the neck. MRI showed Acute 21.4 x 4.9 mm infarct in the right internal capsule posterior limb    Family history of colon cancer (father colon cancer age 60)     father colon cancer age 60    Smoking        Social History     Tobacco Use    Smoking status: Every Day     Packs/day: .25     Types: Cigarettes    Smokeless tobacco: Current   Substance Use Topics    Alcohol use: No           Objective    BP (!) 159/99 (BP Location: Left arm, Patient Position: Sitting, Cuff Size: Adult Regular)   Pulse 74   Temp 97.8  F (36.6  C) (Oral)   Resp 18   Wt 71.7 kg (158 lb)   SpO2 98%   BMI 27.12 kg/m    Physical Exam  Constitutional:       Appearance: Normal appearance.   HENT:      Head: Normocephalic.   Cardiovascular:      Rate and Rhythm: Normal rate and regular rhythm.   Pulmonary:      Effort: Pulmonary effort is normal.      Breath sounds: Normal breath sounds.   Musculoskeletal:      Cervical back: Normal range of motion and neck supple.        Feet:    Feet:      Comments: Erythema, edema and tenderness on the MTP joint, right big toe  Lymphadenopathy:      Head:      Right side of head: No submental, submandibular or tonsillar adenopathy.      Left side of head: No submental, submandibular or tonsillar adenopathy.   Skin:     General: Skin is warm and dry.   Neurological:      Mental Status: He is alert and oriented to person, place, and time.              Tasneem Ayala PA-C

## 2024-01-13 NOTE — PATIENT INSTRUCTIONS
The following can minimize chances of gout flare ups    Losing weight if overweight    Cut down on the following food and drinks:  Red meat and seafood, alcohol, sodas and sugary snacks

## 2024-04-01 ENCOUNTER — OFFICE VISIT (OUTPATIENT)
Dept: URGENT CARE | Facility: URGENT CARE | Age: 54
End: 2024-04-01
Payer: COMMERCIAL

## 2024-04-01 VITALS
HEART RATE: 73 BPM | DIASTOLIC BLOOD PRESSURE: 93 MMHG | WEIGHT: 160.6 LBS | SYSTOLIC BLOOD PRESSURE: 144 MMHG | TEMPERATURE: 97.3 F | OXYGEN SATURATION: 98 % | BODY MASS INDEX: 27.57 KG/M2

## 2024-04-01 DIAGNOSIS — M10.9 ACUTE GOUTY ARTHRITIS: Primary | ICD-10-CM

## 2024-04-01 PROCEDURE — 99213 OFFICE O/P EST LOW 20 MIN: CPT | Performed by: FAMILY MEDICINE

## 2024-04-01 RX ORDER — INDOMETHACIN 50 MG/1
50 CAPSULE ORAL 2 TIMES DAILY WITH MEALS
Qty: 14 CAPSULE | Refills: 0 | Status: SHIPPED | OUTPATIENT
Start: 2024-04-01 | End: 2024-04-08

## 2024-04-01 NOTE — PROGRESS NOTES
SUBJECTIVE:  Chief Complaint   Patient presents with    Musculoskeletal Problem     Right foot pain-possible Gout? Has hx of this   .rafit who presents with a chief complaint of  right foot and toe(s) great pain.  Symptoms began 2 day(s) ago , are moderate andstill present and worsening.  Context:Injury: no  Hxx of gout    Past Medical History:   Diagnosis Date    Cerebrovascular accident (CVA) due to stenosis of cerebral artery (H) 04/19/2019    left upper extremity weakness with slurred speech and left facial droop, consistent with CVA. CTA shows mdoderate short segment stenosis involving the  left internal carotid artery. Unremarkable CTA of the neck. MRI showed Acute 21.4 x 4.9 mm infarct in the right internal capsule posterior limb    Family history of colon cancer (father colon cancer age 60)     father colon cancer age 60    Smoking      No Known Allergies  .socx    ROS:Review of systems negative except as stated below    EXAM: BP (!) 144/93   Pulse 73   Temp 97.3  F (36.3  C)   Wt 72.8 kg (160 lb 9.6 oz)   SpO2 98%   BMI 27.57 kg/m    Exam:foot and toe(s) great  tenderness to palpation and pain with rom  GENERAL APPEARANCE: healthy, alert and no distress  EXTREMITIES: peripheral pulses normal  SKIN: no suspicious lesions or rashes  NEURO: Normal strength and tone, sensory exam grossly normal, mentation intact and speech normal    X-RAY was not done.    ASSESSMENT:     ICD-10-CM    1. Acute gouty arthritis  M10.9 indomethacin (INDOCIN) 50 MG capsule

## 2024-12-09 ENCOUNTER — OFFICE VISIT (OUTPATIENT)
Dept: URGENT CARE | Facility: URGENT CARE | Age: 54
End: 2024-12-09
Payer: COMMERCIAL

## 2024-12-09 VITALS
WEIGHT: 165 LBS | TEMPERATURE: 98.1 F | HEART RATE: 79 BPM | DIASTOLIC BLOOD PRESSURE: 90 MMHG | BODY MASS INDEX: 28.32 KG/M2 | OXYGEN SATURATION: 96 % | RESPIRATION RATE: 16 BRPM | SYSTOLIC BLOOD PRESSURE: 140 MMHG

## 2024-12-09 DIAGNOSIS — Z72.0 TOBACCO ABUSE DISORDER: ICD-10-CM

## 2024-12-09 DIAGNOSIS — Z86.73 HISTORY OF CVA (CEREBROVASCULAR ACCIDENT): Primary | ICD-10-CM

## 2024-12-09 DIAGNOSIS — R20.0 LEFT ARM NUMBNESS: ICD-10-CM

## 2024-12-09 LAB — TROPONIN T SERPL HS-MCNC: 18 NG/L

## 2024-12-09 PROCEDURE — 84484 ASSAY OF TROPONIN QUANT: CPT | Performed by: PHYSICIAN ASSISTANT

## 2024-12-09 PROCEDURE — 99215 OFFICE O/P EST HI 40 MIN: CPT | Performed by: PHYSICIAN ASSISTANT

## 2024-12-09 PROCEDURE — 36415 COLL VENOUS BLD VENIPUNCTURE: CPT | Performed by: PHYSICIAN ASSISTANT

## 2024-12-09 PROCEDURE — 93000 ELECTROCARDIOGRAM COMPLETE: CPT | Performed by: PHYSICIAN ASSISTANT

## 2024-12-10 NOTE — PROGRESS NOTES
Assessment & Plan     History of CVA (cerebrovascular accident)    Patient states he had a stroke 4 yrs ago that affected his left side  He has had just LUE numbness for 2-3 weeks  He has had no other symptoms    EKG is normal  Patient referred to the [facility] for MRI/MRA of brain and neck, MRI cervical spine for radiculopathy  This has been 3 weeks so [facility] is appropriate  If any other symptoms occur, headache, dizziness, lightheadednes go to the ED    Left arm numbness    EKG normal sinus rhythm without ST changes  Troponin neg   No signs for cardiac damage contributing to left arm numbness  - EKG 12-lead complete w/read - Clinics  - Troponin T, High Sensitivity  - Troponin T, High Sensitivity    Tobacco abuse disorder    Hx of tobacco use       Referral to [facility]    No follow-ups on file.    Nav Mcdowell, [facility], PA-C  [facility] JAYME Cesar is a 54 year old male who presents to clinic today for the following health issues:  Chief Complaint   Patient presents with    Urgent Care     Pt states he has numbness in hand multiple times a day X 2-3 weeks-has had a stroke in the past       HPI  Review of Systems  Constitutional, HEENT, cardiovascular, pulmonary, GI, , musculoskeletal, neuro, skin, endocrine and psych systems are negative, except as otherwise noted.      Objective    BP (!) 140/90   Pulse 79   Temp 98.1  F (36.7  C) (Tympanic)   Resp 16   Wt 74.8 kg (165 lb)   SpO2 96%   BMI 28.32 kg/m    Physical Exam   GENERAL: alert and no distress  EYES: Eyes grossly normal to inspection, PERRL and conjunctivae and sclerae normal  HENT: ear canals and TM's normal, nose and mouth without ulcers or lesions  NECK: no adenopathy, no asymmetry, masses, or scars  RESP: lungs clear to auscultation - no rales, rhonchi or wheezes  CV: regular rate and rhythm, normal S1 S2, no S3 or S4, no murmur, click or rub, no peripheral edema  ABDOMEN: soft, nontender, no hepatosplenomegaly, no masses  and bowel sounds normal  MS: no gross musculoskeletal defects noted, no edema  SKIN: no suspicious lesions or rashes  NEURO: pos for numbness left upper arm  PSYCH: mentation appears normal, affect normal/bright      Results for orders placed or performed in visit on 12/09/24   Troponin T, High Sensitivity     Status: Normal   Result Value Ref Range    Troponin T, High Sensitivity 18 <=22 ng/L

## 2024-12-11 ENCOUNTER — HOSPITAL ENCOUNTER (OUTPATIENT)
Dept: MRI IMAGING | Facility: CLINIC | Age: 54
Discharge: HOME | End: 2024-12-11
Attending: PHYSICIAN ASSISTANT
Payer: COMMERCIAL

## 2024-12-11 ENCOUNTER — TELEPHONE (OUTPATIENT)
Dept: NEUROLOGY | Facility: CLINIC | Age: 54
End: 2024-12-11

## 2024-12-11 ENCOUNTER — OFFICE VISIT (OUTPATIENT)
Dept: PEDIATRICS | Facility: CLINIC | Age: 54
End: 2024-12-11
Payer: COMMERCIAL

## 2024-12-11 VITALS
TEMPERATURE: 97.6 F | DIASTOLIC BLOOD PRESSURE: 99 MMHG | HEART RATE: 83 BPM | BODY MASS INDEX: 28.17 KG/M2 | OXYGEN SATURATION: 98 % | RESPIRATION RATE: 16 BRPM | HEIGHT: 64 IN | WEIGHT: 165 LBS | SYSTOLIC BLOOD PRESSURE: 162 MMHG

## 2024-12-11 DIAGNOSIS — M50.30 BULGING OF CERVICAL INTERVERTEBRAL DISC: ICD-10-CM

## 2024-12-11 DIAGNOSIS — R20.0 LEFT UPPER EXTREMITY NUMBNESS: ICD-10-CM

## 2024-12-11 DIAGNOSIS — I66.03 MIDDLE CEREBRAL ARTERY STENOSIS, BILATERAL: ICD-10-CM

## 2024-12-11 DIAGNOSIS — Z86.73 HISTORY OF CVA (CEREBROVASCULAR ACCIDENT): ICD-10-CM

## 2024-12-11 DIAGNOSIS — M50.30 DDD (DEGENERATIVE DISC DISEASE), CERVICAL: Primary | ICD-10-CM

## 2024-12-11 DIAGNOSIS — R03.0 ELEVATED BP WITHOUT DIAGNOSIS OF HYPERTENSION: ICD-10-CM

## 2024-12-11 PROCEDURE — 72141 MRI NECK SPINE W/O DYE: CPT

## 2024-12-11 PROCEDURE — 70544 MR ANGIOGRAPHY HEAD W/O DYE: CPT

## 2024-12-11 PROCEDURE — 70549 MR ANGIOGRAPH NECK W/O&W/DYE: CPT

## 2024-12-11 PROCEDURE — A9585 GADOBUTROL INJECTION: HCPCS | Performed by: PHYSICIAN ASSISTANT

## 2024-12-11 PROCEDURE — 70553 MRI BRAIN STEM W/O & W/DYE: CPT

## 2024-12-11 PROCEDURE — 255N000002 HC RX 255 OP 636: Performed by: PHYSICIAN ASSISTANT

## 2024-12-11 RX ORDER — PREDNISONE 20 MG/1
40 TABLET ORAL DAILY
Qty: 10 TABLET | Refills: 0 | Status: SHIPPED | OUTPATIENT
Start: 2024-12-11 | End: 2024-12-11

## 2024-12-11 RX ORDER — LISINOPRIL 10 MG/1
10 TABLET ORAL DAILY
Qty: 30 TABLET | Refills: 0 | Status: SHIPPED | OUTPATIENT
Start: 2024-12-11

## 2024-12-11 RX ORDER — GADOBUTROL 604.72 MG/ML
10 INJECTION INTRAVENOUS ONCE
Status: COMPLETED | OUTPATIENT
Start: 2024-12-11 | End: 2024-12-11

## 2024-12-11 RX ADMIN — GADOBUTROL 10 ML: 604.72 INJECTION INTRAVENOUS at 10:48

## 2024-12-11 SDOH — HEALTH STABILITY: PHYSICAL HEALTH: ON AVERAGE, HOW MANY MINUTES DO YOU ENGAGE IN EXERCISE AT THIS LEVEL?: 60 MIN

## 2024-12-11 SDOH — HEALTH STABILITY: PHYSICAL HEALTH: ON AVERAGE, HOW MANY DAYS PER WEEK DO YOU ENGAGE IN MODERATE TO STRENUOUS EXERCISE (LIKE A BRISK WALK)?: 7 DAYS

## 2024-12-11 ASSESSMENT — SOCIAL DETERMINANTS OF HEALTH (SDOH)
HOW OFTEN DO YOU ATTENT MEETINGS OF THE CLUB OR ORGANIZATION YOU BELONG TO?: NEVER
HOW OFTEN DO YOU GET TOGETHER WITH FRIENDS OR RELATIVES?: TWICE A WEEK
DO YOU BELONG TO ANY CLUBS OR ORGANIZATIONS SUCH AS CHURCH GROUPS UNIONS, FRATERNAL OR ATHLETIC GROUPS, OR SCHOOL GROUPS?: NO
HOW OFTEN DO YOU ATTEND CHURCH OR RELIGIOUS SERVICES?: MORE THAN 4 TIMES PER YEAR
IN A TYPICAL WEEK, HOW MANY TIMES DO YOU TALK ON THE PHONE WITH FAMILY, FRIENDS, OR NEIGHBORS?: THREE TIMES A WEEK

## 2024-12-11 ASSESSMENT — PAIN SCALES - GENERAL: PAINLEVEL_OUTOF10: NO PAIN (0)

## 2024-12-11 ASSESSMENT — LIFESTYLE VARIABLES
HOW MANY STANDARD DRINKS CONTAINING ALCOHOL DO YOU HAVE ON A TYPICAL DAY: PATIENT DOES NOT DRINK
AUDIT-C TOTAL SCORE: 0
HOW OFTEN DO YOU HAVE SIX OR MORE DRINKS ON ONE OCCASION: NEVER
SKIP TO QUESTIONS 9-10: 1
HOW OFTEN DO YOU HAVE A DRINK CONTAINING ALCOHOL: NEVER

## 2024-12-11 NOTE — PROGRESS NOTES
Assessment/Plan:    MRI cervical spine c/w neural foraminal stenosis, multilevel degenerative disc disease with bulging discs at multiple levels, suspect symptoms due to cervical radiculopathy. Placed referral to see spine specialist.     MRI brain and MRA neck unremarkable. MRA brain shows:  New/progressed short segment stenoses involving the distal M1/proximal M2 segment of the right middle cerebral artery (severe), and the mid M1 segment of the left middle cerebral artery (moderate). CTA may be helpful for further characterization. Vascular neurology consultation is suggested.  2.    Unchanged moderate/moderate to severe segmental stenosis of the supraclinoid segment of the left internal carotid artery.    Discussed this finding with Dr. Rodriguez Garzon from stroke neurology, who reviewed both today's images as well as images from 4/19/19 when patient had CVA. He feels the middle cerebral artery stenosis was present on prior images, but has progressed since then. He recommended medical management with BP control, statin, and aspirin, and outpatient follow-up with vascular neurology. Referral placed.    BP is significantly elevated today as well. Upon chart review, patient has had several elevated BPs in recent clinic visits. Suspect hypertension. Due to patient's history of CVA and severe middle cerebral artery stenosis seen on MRA today, BP control is very important. Will start him on lisinopril 10 mg. Arranged for follow up in primary care clinic tomorrow. He also reports he has not been taking his atorvastatin; stressed importance of taking this as well. Continue on 81 mg aspirin.     See patient instructions below.    At the end of the encounter, I discussed results, diagnosis, medications. Discussed red flags for immediate return to clinic/ER, as well as indications for follow up if no improvement. Patient understood and agreed to plan. Patient was stable for discharge.    65 minutes was spent preparing for  the visit and reviewing the patient's chart; getting a history and performing an exam; counseling and providing education to the patient, family, or caregiver; ordering medicines and/or tests; communicating with other healthcare professionals; documenting information in the medical record; interpreting results and sharing that information with the patient, family, or caregiver; and care coordination.       ICD-10-CM    1. DDD (degenerative disc disease), cervical  M50.30 Spine  Referral     DISCONTINUED: predniSONE (DELTASONE) 20 MG tablet      2. Bulging of cervical intervertebral disc  M50.30 Spine  Referral     DISCONTINUED: predniSONE (DELTASONE) 20 MG tablet      3. Middle cerebral artery stenosis, bilateral  I66.03 Adult Neurology  Referral     CANCELED: CTA Head Neck with Contrast      4. Left upper extremity numbness  R20.0 sodium chloride (PF) 0.9% PF flush 3 mL     MR Brain w/o & w Contrast     MRA Brain ([place] [facility] [place]) w/o Contrast     MRA Neck ([facility]) w/o & w Contrast     MR Cervical Spine w/o Contrast      5. History of CVA (cerebrovascular accident)  Z86.73 Adult Neurology  Referral      6. Elevated BP without diagnosis of hypertension  R03.0 lisinopril (ZESTRIL) 10 MG tablet            Return in about 1 day (around 12/12/2024) for Recheck with primary care provider.    JERARDO Sawyer, PATERESE  [facility]  -----------------------------------------------------------------------------------------------------------------------------------------------------    HPI:  Arsenio Pavon is a 54 year old male who presents for evaluation of the following:    Musculoskeletal problem/pain  Onset/Duration: 3 weeks   Description:       Location:: lateral aspect of upper arm and forearm going into thumb and index finger       Joint swelling: no        Redness: no        Pain: 0/10       Warmth: no   Progression of symptoms same  Accompanying signs  "and symptoms:       Fevers: no        Numbness/tingling/weakness: YES- paresthesias  History        Trauma to the area: no         Previous history of Gout: no         Alcohol usage: no         Diuretic use: no         Recent illness: no         Previous similar problem: no         Previous evaluation: no   Aggravating factors include: none  Therapies tried and outcome: nothing  Have you had any surgeries on your arteries of veins: No    Reports intermittent numbness x 3 weeks in left upper extremity. Happens at least 7 times a day and lasts for about 1 minute each time.  Hx of CVA in 2019 with very minimal residual left upper extremity weakness, feels he \"about 95% recovered\". No new weakness.    Past Medical History:   Diagnosis Date    Cerebrovascular accident (CVA) due to stenosis of cerebral artery (H) 04/19/2019    left upper extremity weakness with slurred speech and left facial droop, consistent with CVA. CTA shows mdoderate short segment stenosis involving the  left internal carotid artery. Unremarkable CTA of the neck. MRI showed Acute 21.4 x 4.9 mm infarct in the right internal capsule posterior limb    Family history of colon cancer (father colon cancer age 60)     father colon cancer age 60    Smoking        Vitals:    12/11/24 0916 12/11/24 0941   BP: (!) 175/99 (!) 162/99   BP Location: Left arm    Patient Position: Sitting Sitting   Cuff Size: Adult Regular    Pulse: 87 83   Resp: 16    Temp: 97.6  F (36.4  C)    TempSrc: Oral    SpO2: 98%    Weight: 74.8 kg (165 lb)    Height: 1.626 m (5' 4\")        Physical Exam  Vitals and nursing note reviewed.   Cardiovascular:      Pulses:           Radial pulses are 3+ on the right side and 3+ on the left side.   Pulmonary:      Effort: Pulmonary effort is normal.   Musculoskeletal:      Cervical back: No spinous process tenderness or muscular tenderness. Normal range of motion.   Neurological:      Mental Status: He is alert.      GCS: GCS eye subscore is 4. " GCS verbal subscore is 5. GCS motor subscore is 6.      Cranial Nerves: Cranial nerves 2-12 are intact.      Sensory: Sensation is intact.      Motor: Motor function is intact.      Coordination: Coordination is intact.      Gait: Gait is intact.         Labs/Imaging:  No results found for this or any previous visit (from the past 24 hours).  No results found for this or any previous visit (from the past 24 hours).        Patient Instructions   Follow up with:  1) primary care tomorrow for blood pressure management. Recommend starting lisinopril 10 mg. Take your atorvastatin and aspirin daily.   2) vascular neurology to discuss narrowing of arteries in your brain, which puts you at risk of having another stroke.   3) spine specialist to discuss arthritis in your spine and bulging discs, which is likely the cause of the intermittent tingling in your left arm.     Go to ER for new/worsening symptoms.

## 2024-12-11 NOTE — PROGRESS NOTES
"Acute and Diagnostic Services Clinic Visit    {PROVIDER CHARTING PREFERENCE:262394}    Subjective   Arsenio is a 54 year old, presenting for the following health issues:  Musculoskeletal Problem (Numbness in left arm.)  {(!) Visit Details have not yet been documented.  Please enter Visit Details and then use this list to pull in documentation. (Optional):996718}  HPI     Musculoskeletal problem/pain  Onset/Duration: 3 weeks   Description:       Location: left arm       Joint swelling: no        Redness: no        Pain: 0/10       Warmth: no   Progression of symptoms same  Accompanying signs and symptoms:       Fevers: no        Numbness/tingling/weakness: YES  History        Trauma to the area: no         Previous history of Gout: no         Alcohol usage: no         Diuretic use: no         Recent illness: no         Previous similar problem: no         Previous evaluation: no   Aggravating factors include: none  Therapies tried and outcome: nothing  Have you had any surgeries on your arteries of veins: No      {ROS Picklists (Optional):820131}      Objective    BP (!) 175/99 (BP Location: Left arm, Patient Position: Sitting, Cuff Size: Adult Regular)   Pulse 87   Temp 97.6  F (36.4  C) (Oral)   Resp 16   Ht 1.626 m (5' 4\")   Wt 74.8 kg (165 lb)   SpO2 98%   BMI 28.32 kg/m    Body mass index is 28.32 kg/m .  Physical Exam   {Exam List (Optional):880817}    {Diagnostic Test Results (Optional):841654}        Signed Electronically by: Tere Hoyos PA-C  {Email feedback regarding this note to [email or web address]   :[id number]}  "

## 2024-12-11 NOTE — PATIENT INSTRUCTIONS
Follow up with:  1) primary care tomorrow for blood pressure management. Recommend starting lisinopril 10 mg. Take your atorvastatin and aspirin daily.   2) vascular neurology to discuss narrowing of arteries in your brain, which puts you at risk of having another stroke.   3) spine specialist to discuss arthritis in your spine and bulging discs, which is likely the cause of the intermittent tingling in your left arm.     Go to ER for new/worsening symptoms.

## 2024-12-11 NOTE — TELEPHONE ENCOUNTER
Telephone Consult Note:    I was paged by Dr. Tere Hoyos from [facility] diagnostic center in [place] regarding patient's MRI and MRI findings.  Patient is a 54-year-old male with history of high blood pressure hyperlipidemia and stroke and 2019.I was able to review his MRI from 2019 which showed acute stroke in subcortical region likely anterior choroidal artery territory CT angiogram of the head at that time showed moderate stenosis of supraclinoid left ICA as well as it was mild to moderate stenosis of MCA on the right side. In 2019 patient presented with left upper extremity weakness slurred speech and left facial droop and was treated with aspirin and Plavix for 21 days and was instructed to take aspirin 325 mg daily thereafter     He is being evaluated in acute diagnostic center today for intermittent left upper extremity numbness for the past 3 weeks.  According to the provide the symptoms are intermittent.  Upon questioning further the distribution of sensory changes like numbness is on the lateral aspect of arm lateral aspect of forearm and involve the thumb and index finger on the left side.  There were no other focal neurological deficits reported by the provider.      MRI brain     IMPRESSION:  1. No acute intracranial process identified.  2. Interval expected evolution of a small area of chronic infarction  near the junction of the right lentiform nucleus and the posterior  limb of the right internal capsule.          MRA neck:    IMPRESSION: Unremarkable MR angiogram of the neck.       MRA head:   IMPRESSION:  1. New/progressed short segment stenoses involving the distal  M1/proximal M2 segment of the right middle cerebral artery (severe),  and the mid M1 segment of the left middle cerebral artery (moderate).  CTA may be helpful for further characterization. Vascular neurology  consultation is suggested.  2. Unchanged moderate/moderate to severe segmental stenosis of the  supraclinoid segment  of the left internal carotid artery.  3. Otherwise, no additional new/progressed high-grade proximal  arterial stenosis or occlusion of the major intracranial arteries.  4. No intracranial aneurysm or high-flow vascular formation  identified.        MR C spine:    IMPRESSION:  1. Multilevel degenerative changes, as described.  2. Moderate spinal canal stenosis at C3-C4. Mild to moderate spinal  canal stenosis at C5-C6 and C6-C7. Milder degrees of spinal canal  narrowing elsewhere.  3. Varying degrees of multilevel neural foraminal stenosis, as  detailed in the body of the report.    Impression:  Left upper extremity numbness.  The differential diagnosis for these intermittent symptoms include not limited to TIA and all cervical pathology.  Description and distribution of sensory changes with C5 dermatomal pattern.  I was not reported of any other focal neurological deficits.  His MRI today is reassuring and negative for acute stroke.  However his MRA head shows right MCA stenosis which I believe appears to have progressed from previous CTA done in 2019.  At this time patient does not have acute ischemic stroke which is confirmed by MRI of the brain.  Will recommend continuing aspirin monotherapy for secondary stroke prevention.  Recommend vessels factor control including LDL goal of less than 70.  Would recommend outpatient slow gradual titration of blood pressure for goal of less than 140/90.  Outpatient vascular neurology follow-up.  Patient should return to ER if symptoms are persistent and worse and if he has new neurological symptoms.  I consulted on this patient via telephone.  I did not have the opportunity of examining the patient.  My assessment and plan is based on the information provided to me by the provider on the phone.      Rodriguez Garzon MD

## 2024-12-12 ENCOUNTER — OFFICE VISIT (OUTPATIENT)
Dept: INTERNAL MEDICINE | Facility: CLINIC | Age: 54
End: 2024-12-12
Payer: COMMERCIAL

## 2024-12-12 VITALS
HEIGHT: 64 IN | DIASTOLIC BLOOD PRESSURE: 101 MMHG | OXYGEN SATURATION: 98 % | SYSTOLIC BLOOD PRESSURE: 159 MMHG | BODY MASS INDEX: 27.71 KG/M2 | TEMPERATURE: 97.8 F | WEIGHT: 162.3 LBS | HEART RATE: 77 BPM

## 2024-12-12 DIAGNOSIS — I10 PRIMARY HYPERTENSION: Primary | ICD-10-CM

## 2024-12-12 DIAGNOSIS — I66.09 STENOSIS OF MIDDLE CEREBRAL ARTERY, UNSPECIFIED LATERALITY: ICD-10-CM

## 2024-12-12 DIAGNOSIS — E78.5 HYPERLIPIDEMIA LDL GOAL <100: ICD-10-CM

## 2024-12-12 DIAGNOSIS — I63.50 CEREBROVASCULAR ACCIDENT (CVA) DUE TO STENOSIS OF CEREBRAL ARTERY (H): ICD-10-CM

## 2024-12-12 DIAGNOSIS — E78.5 HYPERLIPIDEMIA LDL GOAL <130: ICD-10-CM

## 2024-12-12 PROCEDURE — 99214 OFFICE O/P EST MOD 30 MIN: CPT | Performed by: INTERNAL MEDICINE

## 2024-12-12 RX ORDER — ATORVASTATIN CALCIUM 40 MG/1
40 TABLET, FILM COATED ORAL DAILY
Qty: 90 TABLET | Refills: 3 | Status: SHIPPED | OUTPATIENT
Start: 2024-12-12

## 2024-12-12 NOTE — PROGRESS NOTES
"  {PROVIDER CHARTING PREFERENCE:404107}    Subjective   Arsenio is a 54 year old, presenting for the following health issues:  Hypertension and Numbness (Left hand )    History of Present Illness       Reason for visit:  Numb left hand  Symptom onset:  3-4 weeks ago  Symptom intensity:  Severe  Symptom progression:  Staying the same  Had these symptoms before:  No  What makes it worse:  No  What makes it better:  No   He is taking medications regularly.       {MA/LPN/RN Pre-Provider Visit Orders- hCG/UA/Strep (Optional):896167}  {SUPERLIST (Optional):839976}  {additonal problems for provider to add (Optional):908015}    {ROS Picklists (Optional):750890}      Objective    BP (!) 159/101   Pulse 77   Temp 97.8  F (36.6  C) (Temporal)   Ht 1.626 m (5' 4\")   Wt 73.6 kg (162 lb 4.8 oz)   SpO2 98%   BMI 27.86 kg/m    Body mass index is 27.86 kg/m .  Physical Exam   {Exam List (Optional):820096}    Well appearing  Rrr  CTAB  No LE edema      {Diagnostic Test Results (Optional):063371}        Signed Electronically by: Pia Alanis MD  {Email feedback regarding this note to [email or web address]   :030651}  "

## 2025-01-20 ENCOUNTER — OFFICE VISIT (OUTPATIENT)
Dept: INTERNAL MEDICINE | Facility: CLINIC | Age: 55
End: 2025-01-20
Payer: COMMERCIAL

## 2025-01-20 VITALS
HEART RATE: 81 BPM | TEMPERATURE: 97.7 F | DIASTOLIC BLOOD PRESSURE: 90 MMHG | BODY MASS INDEX: 27.66 KG/M2 | WEIGHT: 162 LBS | SYSTOLIC BLOOD PRESSURE: 152 MMHG | HEIGHT: 64 IN | OXYGEN SATURATION: 98 %

## 2025-01-20 DIAGNOSIS — I63.50 CEREBROVASCULAR ACCIDENT (CVA) DUE TO STENOSIS OF CEREBRAL ARTERY (H): Primary | ICD-10-CM

## 2025-01-20 DIAGNOSIS — F17.200 TOBACCO USE DISORDER: ICD-10-CM

## 2025-01-20 DIAGNOSIS — R73.01 IMPAIRED FASTING GLUCOSE: ICD-10-CM

## 2025-01-20 DIAGNOSIS — Z86.19 HISTORY OF HEPATITIS B VIRUS INFECTION: ICD-10-CM

## 2025-01-20 DIAGNOSIS — Z00.00 ANNUAL PHYSICAL EXAM: ICD-10-CM

## 2025-01-20 DIAGNOSIS — I10 ESSENTIAL HYPERTENSION: ICD-10-CM

## 2025-01-20 DIAGNOSIS — M10.9 GOUTY ARTHRITIS: ICD-10-CM

## 2025-01-20 DIAGNOSIS — Z80.0 FAMILY HISTORY OF COLON CANCER: ICD-10-CM

## 2025-01-20 DIAGNOSIS — E78.5 HYPERLIPIDEMIA LDL GOAL <130: ICD-10-CM

## 2025-01-20 DIAGNOSIS — L60.8 LONGITUDINAL MELANONYCHIA: ICD-10-CM

## 2025-01-20 PROBLEM — Z86.73 HISTORY OF CVA (CEREBROVASCULAR ACCIDENT): Status: ACTIVE | Noted: 2019-04-19

## 2025-01-20 LAB
ALT SERPL W P-5'-P-CCNC: 45 U/L (ref 0–70)
ANION GAP SERPL CALCULATED.3IONS-SCNC: 9 MMOL/L (ref 7–15)
AST SERPL W P-5'-P-CCNC: 26 U/L (ref 0–45)
BUN SERPL-MCNC: 20.4 MG/DL (ref 6–20)
CALCIUM SERPL-MCNC: 10.2 MG/DL (ref 8.8–10.4)
CHLORIDE SERPL-SCNC: 107 MMOL/L (ref 98–107)
CHOLEST SERPL-MCNC: 162 MG/DL
CREAT SERPL-MCNC: 0.89 MG/DL (ref 0.67–1.17)
CREAT UR-MCNC: 60.9 MG/DL
EGFRCR SERPLBLD CKD-EPI 2021: >90 ML/MIN/1.73M2
EST. AVERAGE GLUCOSE BLD GHB EST-MCNC: 128 MG/DL
FASTING STATUS PATIENT QL REPORTED: NO
FASTING STATUS PATIENT QL REPORTED: NO
GLUCOSE SERPL-MCNC: 77 MG/DL (ref 70–99)
HBA1C MFR BLD: 6.1 % (ref 0–5.6)
HBV CORE AB SERPL QL IA: NONREACTIVE
HBV SURFACE AB SERPL IA-ACNC: 265 M[IU]/ML
HBV SURFACE AB SERPL IA-ACNC: REACTIVE M[IU]/ML
HBV SURFACE AG SERPL QL IA: NONREACTIVE
HCO3 SERPL-SCNC: 26 MMOL/L (ref 22–29)
HDLC SERPL-MCNC: 40 MG/DL
LDLC SERPL CALC-MCNC: 70 MG/DL
MICROALBUMIN UR-MCNC: <12 MG/L
MICROALBUMIN/CREAT UR: NORMAL MG/G{CREAT}
NONHDLC SERPL-MCNC: 122 MG/DL
POTASSIUM SERPL-SCNC: 5 MMOL/L (ref 3.4–5.3)
SODIUM SERPL-SCNC: 142 MMOL/L (ref 135–145)
TRIGL SERPL-MCNC: 260 MG/DL
URATE SERPL-MCNC: 7.2 MG/DL (ref 3.4–7)

## 2025-01-20 PROCEDURE — 99396 PREV VISIT EST AGE 40-64: CPT | Performed by: NURSE PRACTITIONER

## 2025-01-20 PROCEDURE — 83036 HEMOGLOBIN GLYCOSYLATED A1C: CPT | Performed by: NURSE PRACTITIONER

## 2025-01-20 PROCEDURE — 82570 ASSAY OF URINE CREATININE: CPT | Performed by: NURSE PRACTITIONER

## 2025-01-20 PROCEDURE — 84460 ALANINE AMINO (ALT) (SGPT): CPT | Performed by: NURSE PRACTITIONER

## 2025-01-20 PROCEDURE — 86706 HEP B SURFACE ANTIBODY: CPT | Performed by: NURSE PRACTITIONER

## 2025-01-20 PROCEDURE — 87340 HEPATITIS B SURFACE AG IA: CPT | Performed by: NURSE PRACTITIONER

## 2025-01-20 PROCEDURE — 80048 BASIC METABOLIC PNL TOTAL CA: CPT | Performed by: NURSE PRACTITIONER

## 2025-01-20 PROCEDURE — 80061 LIPID PANEL: CPT | Performed by: NURSE PRACTITIONER

## 2025-01-20 PROCEDURE — 82043 UR ALBUMIN QUANTITATIVE: CPT | Performed by: NURSE PRACTITIONER

## 2025-01-20 PROCEDURE — 36415 COLL VENOUS BLD VENIPUNCTURE: CPT | Performed by: NURSE PRACTITIONER

## 2025-01-20 PROCEDURE — 84550 ASSAY OF BLOOD/URIC ACID: CPT | Performed by: NURSE PRACTITIONER

## 2025-01-20 PROCEDURE — 84450 TRANSFERASE (AST) (SGOT): CPT | Performed by: NURSE PRACTITIONER

## 2025-01-20 PROCEDURE — 86704 HEP B CORE ANTIBODY TOTAL: CPT | Performed by: NURSE PRACTITIONER

## 2025-01-20 PROCEDURE — 99214 OFFICE O/P EST MOD 30 MIN: CPT | Mod: 25 | Performed by: NURSE PRACTITIONER

## 2025-01-20 RX ORDER — LISINOPRIL 10 MG/1
10 TABLET ORAL DAILY
Qty: 90 TABLET | Refills: 0 | Status: SHIPPED | OUTPATIENT
Start: 2025-01-20

## 2025-01-20 SDOH — HEALTH STABILITY: PHYSICAL HEALTH: ON AVERAGE, HOW MANY DAYS PER WEEK DO YOU ENGAGE IN MODERATE TO STRENUOUS EXERCISE (LIKE A BRISK WALK)?: 6 DAYS

## 2025-01-20 ASSESSMENT — SOCIAL DETERMINANTS OF HEALTH (SDOH): HOW OFTEN DO YOU GET TOGETHER WITH FRIENDS OR RELATIVES?: THREE TIMES A WEEK

## 2025-01-20 NOTE — PROGRESS NOTES
Preventive Care Visit  [facility]  LIT Carnes CNP, Internal Medicine  Jan 20, 2025    Assessment & Plan     (Z00.00) Annual physical exam  (primary encounter diagnosis)  Comment: Past medical history, surgical history, family history, social history, sexual history, medications, allergies, and immunizations reviewed and updated as appropriate.   Care gaps addressed, including routine screenings.  Annual lab work ordered.  Physical exam unremarkable, except as noted.  Diet/exercise recommendations discussed.    (I10) Essential hypertension  Comment: Blood pressure elevated in clinic today, but he has been without his medication for the last week.  Refill sent to pharmacy  DME BP cuff ordered so he can check blood pressures at home.  Reviewed proper technique.  Labs ordered for monitoring.  Follow-up in 3 months, sooner if home BP trend is consistently higher than 130/80.  Plan: lisinopril (ZESTRIL) 10 MG tablet, Home Blood         Pressure Monitor Order for DME - ONLY FOR DME,         Albumin Random Urine Quantitative with Creat         Ratio    (I63.50) Cerebrovascular accident (CVA) due to stenosis of cerebral artery (H)  Comment: Mild residual left upper extremity symptoms secondary to 2019 CVA.  Reviewed risk factors for DVT, including ongoing smoking and currently uncontrolled high blood pressure.  He is not followed by cardiology or neurology.    (E78.5) Hyperlipidemia LDL goal <130  Comment: Labs ordered for monitoring.  He is taking atorvastatin 40 mg daily.  Consider maximizing dose, depending on LDL level, given previous CVA.    (F17.200) Tobacco use disorder  Comment: He is not interested in cutting back further or quitting smoking at this time.  He primarily uses cigarettes for stress management.  Discussed the need for alternate stress management techniques to help readiness for smoking.  He declines any intervention today.    (R73.01) Impaired fasting  "glucose  Comment: Labs ordered for monitoring.  Plan: Hemoglobin A1c    (M10.9) Gouty arthritis  Comment: He is not currently taking medication for gout and does not have any gout symptoms at present.  Labs ordered for monitoring.  Plan: Uric acid    (Z80.0) Family history of colon cancer  Comment: Colon cancer screening is up-to-date.  More frequent screening performed due to family history of colon cancer in father.    (Z86.19) History of hepatitis B virus infection  Comment: He reports history of hepatitis B virus infection, which resolved, in [place].  Will check titers to verify.  Plan: Hepatitis B surface antigen, Hepatitis B core         antibody, Hepatitis B Surface Antibody  ADDENDUM 1/21/25: Hepatitis B titers demonstrate immunity via previous vaccination. No evidence for previous hepatitis B infection.    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  He reports symptoms have been progressive over the last few years.  Symptoms initially started on his left thumb and now are noted on the left first, second, and third fingers.  Referral to dermatology for further evaluation and management.  Plan: Adult Dermatology  Referral     Patient has been advised of split billing requirements and indicates understanding: Yes    BMI  Estimated body mass index is 27.81 kg/m  as calculated from the following:    Height as of this encounter: 1.626 m (5' 4\").    Weight as of this encounter: 73.5 kg (162 lb).   Weight management plan: Discussed healthy diet and exercise guidelines    Counseling  Appropriate preventive services were addressed with this patient via screening, questionnaire, or discussion as appropriate for fall prevention, nutrition, physical activity, Tobacco-use cessation, social engagement, weight loss and cognition.  Checklist reviewing preventive services available has been given to the patient.  Reviewed patient's diet, addressing concerns and/or questions.     See " "Patient Instructions        Subjective   Arsenio is a 54 year old, presenting for the following:  Physical    Here for annual exam.    The following concerns were also addressed today:  HTN: Has been out of lisinopril for about 1 week; no symptoms with elevated BP. Does not have a home BP cuff but would like one.  Smoking cessation: Uses for stress management. History of CVA in 2019; left hand is \"90%\" back to baseline. Discussed increased risk of another CVA with continued smoking. Is not yet ready to quit.  PSA screen: No urinary symptoms. PSA previously normal.  Gouty arthritis: Experiences symptom flares once annually in the fall.  Symptoms are typically located in the first right MTP joint. Is not taking any medication for gout.      Health Maintenance:  Vaccines due: influenza, COVID-19, Tdap/Td, Hepatitis B, and Zoster; declines. Reports history of resolved hepatitis B infection from [place]; will check labs.  Colon cancer screening: Next due 1/2028. Family history of colon cancer?: Yes; father  DEXA: N/A.  Screening chest CT: Does not meet criteria; 10.5 pack years. Family history of lung cancer?: no  AAA screen: Age 64  Vision: No concerns. Wears glasses; last eye exam was 5 months ago.  Dental: No concerns.   Hearing: No concerns.     Sexual Health History:  Partners:Female  Contraception: female surgical           1/20/2025   General Health   How would you rate your overall physical health? (!) FAIR   Feel stress (tense, anxious, or unable to sleep) Not at all         1/20/2025   Nutrition   Three or more servings of calcium each day? (!) I DON'T KNOW   Diet: Low salt    Low fat/cholesterol   How many servings of fruit and vegetables per day? (!) 0-1   How many sweetened beverages each day? (!) 2       Multiple values from one day are sorted in reverse-chronological order         1/20/2025   Exercise   Days per week of moderate/strenous exercise 6 days         1/20/2025   Social Factors   Frequency of " gathering with friends or relatives Three times a week   Worry food won't last until get money to buy more Patient declined   Food not last or not have enough money for food? Patient declined   Do you have housing? (Housing is defined as stable permanent housing and does not include staying ouside in a car, in a tent, in an abandoned building, in an overnight shelter, or couch-surfing.) Patient declined   Are you worried about losing your housing? Patient declined   Lack of transportation? Patient declined   Unable to get utilities (heat,electricity)? Patient declined         1/20/2025   Fall Risk   Fallen 2 or more times in the past year? No   Trouble with walking or balance? No          1/20/2025   Dental   Dentist two times every year? Yes         1/20/2025   TB Screening   Were you born outside of the [place]? Yes     Today's PHQ-2 Score:       1/20/2025    11:06 AM   PHQ-2 ( 1999 [employer])   Q1: Little interest or pleasure in doing things 0   Q2: Feeling down, depressed or hopeless 0   PHQ-2 Score 0    Q1: Little interest or pleasure in doing things Not at all   Q2: Feeling down, depressed or hopeless Not at all   PHQ-2 Score 0       Patient-reported         1/20/2025   Substance Use   Alcohol more than 3/day or more than 7/wk No   Do you use any other substances recreationally? No     Social History     Tobacco Use    Smoking status: Former     Current packs/day: 0.25     Average packs/day: 0.3 packs/day for 42.1 years (10.5 ttl pk-yrs)     Types: Cigarettes     Start date: 1978     Quit date: 1990    Smokeless tobacco: Former   Vaping Use    Vaping status: Never Used   Substance Use Topics    Alcohol use: No    Drug use: No         1/20/2025   STI Screening   New sexual partner(s) since last STI/HIV test? No   Last PSA:   Prostate Specific Antigen Screen   Date Value Ref Range Status   11/06/2023 0.21 0.00 - 3.50 ng/mL Final   09/19/2022 0.23 0.00 - 4.00 ug/L Final     ASCVD Risk   The ASCVD Risk score  "(Margaret BREWER, et al., 2019) failed to calculate for the following reasons:    Risk score cannot be calculated because patient has a medical history suggesting prior/existing ASCVD      Reviewed and updated as needed this visit by Provider   Tobacco     Med Hx  Surg Hx  Fam Hx  Soc Hx Sexual Activity            Review of Systems  Constitutional, HEENT, cardiovascular, pulmonary, GI, , musculoskeletal, neuro, skin, endocrine and psych systems are negative, except as otherwise noted.     Objective    Exam  BP (!) 152/90   Pulse 81   Temp 97.7  F (36.5  C)   Ht 1.626 m (5' 4\")   Wt 73.5 kg (162 lb)   SpO2 98%   BMI 27.81 kg/m     Estimated body mass index is 27.81 kg/m  as calculated from the following:    Height as of this encounter: 1.626 m (5' 4\").    Weight as of this encounter: 73.5 kg (162 lb).    Physical Exam  Vitals and nursing note reviewed.   Constitutional:       Appearance: Normal appearance.   HENT:      Head: Normocephalic and atraumatic.      Right Ear: Tympanic membrane, ear canal and external ear normal.      Left Ear: Tympanic membrane, ear canal and external ear normal.      Nose: Nose normal.      Mouth/Throat:      Pharynx: Oropharynx is clear.   Eyes:      Extraocular Movements: Extraocular movements intact.      Conjunctiva/sclera: Conjunctivae normal.      Pupils: Pupils are equal, round, and reactive to light.   Cardiovascular:      Rate and Rhythm: Normal rate and regular rhythm.      Pulses: Normal pulses.      Heart sounds: Normal heart sounds. No murmur heard.     No friction rub. No gallop.   Pulmonary:      Effort: Pulmonary effort is normal. No respiratory distress.      Breath sounds: Normal breath sounds. No wheezing, rhonchi or rales.   Abdominal:      General: Abdomen is flat. Bowel sounds are normal. There is no distension.      Palpations: Abdomen is soft. There is no mass.      Tenderness: There is no abdominal tenderness. There is no guarding.      Hernia: No hernia is " present.   Musculoskeletal:         General: No swelling. Normal range of motion.      Cervical back: Normal range of motion and neck supple.   Skin:     General: Skin is warm and dry.      Comments: Dark-colored vertical lines in 1st, 2nd, and 3rd fingernails on left hand, most significant on the 2nd finger.   Neurological:      General: No focal deficit present.      Mental Status: He is alert and oriented to person, place, and time.   Psychiatric:         Mood and Affect: Mood normal.         Behavior: Behavior normal.         Thought Content: Thought content normal.         Judgment: Judgment normal.       Signed Electronically by: LIT Carnes CNP

## 2025-01-20 NOTE — PATIENT INSTRUCTIONS
Blood pressure:  Blood pressure cuffs may not be covered by your insurance. A decent wrist BP cuff is around $20-$30 on [employer] or at local retailers, and an upper arm cuff is typically $30-$40. Upper arm cuff is generally more accurate, but the wrist cuff is fine if that is more affordable.     Continue to check your blood pressure once daily. Measure your blood pressure first thing in the morning, 30 minutes before you have had any caffeine, nicotine, or exercise, and after sitting calmly for 3-5 minutes. Ensure that your bladder is not full. Avoid talking during blood pressure measurement. Both feet should be flat on the ground and arm resting on the table (upper arm cuff only) or wrist over the heart (wrist cuff only).   Keep a log of your blood pressure readings and bring it to your next appointment.  GOAL: less than 130/80     BP logs for home monitoring can be printed from this website: [email or web address]     Patient Education   Preventive Care Advice   This is general advice given by our system to help you stay healthy. However, your care team may have specific advice just for you. Please talk to your care team about your preventive care needs.  Nutrition  Eat 5 or more servings of fruits and vegetables each day.  Try wheat bread, brown rice and whole grain pasta (instead of white bread, rice, and pasta).  Get enough calcium and vitamin D. Check the label on foods and aim for 100% of the RDA (recommended daily allowance).  Lifestyle  Exercise at least 150 minutes each week  (30 minutes a day, 5 days a week).  Do muscle strengthening activities 2 days a week. These help control your weight and prevent disease.  No smoking.  Wear sunscreen to prevent skin cancer.  Have a dental exam and cleaning every 6 months.  Yearly exams  See your health care team every year to talk about:  Any changes in your health.  Any medicines your care team  has prescribed.  Preventive care, family planning, and ways to prevent chronic diseases.  Shots (vaccines)   HPV shots (up to age 26), if you've never had them before.  Hepatitis B shots (up to age 59), if you've never had them before.  COVID-19 shot: Get this shot when it's due.  Flu shot: Get a flu shot every year.  Tetanus shot: Get a tetanus shot every 10 years.  Pneumococcal, hepatitis A, and RSV shots: Ask your care team if you need these based on your risk.  Shingles shot (for age 50 and up)  General health tests  Diabetes screening:  Starting at age 35, Get screened for diabetes at least every 3 years.  If you are younger than age 35, ask your care team if you should be screened for diabetes.  Cholesterol test: At age 39, start having a cholesterol test every 5 years, or more often if advised.  Bone density scan (DEXA): At age 50, ask your care team if you should have this scan for osteoporosis (brittle bones).  Hepatitis C: Get tested at least once in your life.  STIs (sexually transmitted infections)  Before age 24: Ask your care team if you should be screened for STIs.  After age 24: Get screened for STIs if you're at risk. You are at risk for STIs (including HIV) if:  You are sexually active with more than one person.  You don't use condoms every time.  You or a partner was diagnosed with a sexually transmitted infection.  If you are at risk for HIV, ask about PrEP medicine to prevent HIV.  Get tested for HIV at least once in your life, whether you are at risk for HIV or not.  Cancer screening tests  Cervical cancer screening: If you have a cervix, begin getting regular cervical cancer screening tests starting at age 21.  Breast cancer scan (mammogram): If you've ever had breasts, begin having regular mammograms starting at age 40. This is a scan to check for breast cancer.  Colon cancer screening: It is important to start screening for colon cancer at age 45.  Have a colonoscopy test every 10 years (or  more often if you're at risk) Or, ask your provider about stool tests like a FIT test every year or Cologuard test every 3 years.  To learn more about your testing options, visit:   .  For help making a decision, visit:   https://bit.ly/uq79521.  Prostate cancer screening test: If you have a prostate, ask your care team if a prostate cancer screening test (PSA) at age 55 is right for you.  Lung cancer screening: If you are a current or former smoker ages 50 to 80, ask your care team if ongoing lung cancer screenings are right for you.  For informational purposes only. Not to replace the advice of your health care provider. Copyright   2023 [facility]. All rights reserved. Clinically reviewed by the [facility] Transitions Program. [employer] 199458 - REV 01/24.

## 2025-01-20 NOTE — CONFIDENTIAL NOTE
NEUROSURGERY - NEW PREVISIT PLANNING    Referring Provider: Tere Hoyos PA-C    OVN 12/11/2024   Reason For Visit: M50.30 (ICD-10-CM) - DDD (degenerative disc disease), cervical   M50.30 (ICD-10-CM) - Bulging of cervical intervertebral disc          IMAGING STATUS/LOCATION DATE/TYPE   MRI PACS 12/11/2024  Cervical  [facility]   CT N/A    XRAY N/A    NOTES STATUS/LOCATION DATE/TYPE   Other specialist OVN: N/A    EMG N/A    INJECTION N/A    PHYSICAL THERAPY N/A    SURGERY N/A

## 2025-01-27 ENCOUNTER — PRE VISIT (OUTPATIENT)
Dept: NEUROSURGERY | Facility: CLINIC | Age: 55
End: 2025-01-27

## 2025-03-12 ENCOUNTER — OFFICE VISIT (OUTPATIENT)
Dept: URGENT CARE | Facility: URGENT CARE | Age: 55
End: 2025-03-12
Payer: COMMERCIAL

## 2025-03-12 VITALS
RESPIRATION RATE: 16 BRPM | HEART RATE: 82 BPM | SYSTOLIC BLOOD PRESSURE: 127 MMHG | OXYGEN SATURATION: 99 % | WEIGHT: 159.4 LBS | DIASTOLIC BLOOD PRESSURE: 83 MMHG | BODY MASS INDEX: 27.36 KG/M2 | TEMPERATURE: 97.8 F

## 2025-03-12 DIAGNOSIS — M10.9 ACUTE GOUT INVOLVING TOE OF RIGHT FOOT, UNSPECIFIED CAUSE: Primary | ICD-10-CM

## 2025-03-12 PROCEDURE — 99214 OFFICE O/P EST MOD 30 MIN: CPT

## 2025-03-12 PROCEDURE — 3074F SYST BP LT 130 MM HG: CPT

## 2025-03-12 PROCEDURE — 84550 ASSAY OF BLOOD/URIC ACID: CPT

## 2025-03-12 PROCEDURE — 36415 COLL VENOUS BLD VENIPUNCTURE: CPT

## 2025-03-12 PROCEDURE — 3079F DIAST BP 80-89 MM HG: CPT

## 2025-03-12 RX ORDER — INDOMETHACIN 50 MG/1
50 CAPSULE ORAL 2 TIMES DAILY WITH MEALS
Qty: 14 CAPSULE | Refills: 0 | Status: SHIPPED | OUTPATIENT
Start: 2025-03-12 | End: 2025-03-19

## 2025-03-12 NOTE — PATIENT INSTRUCTIONS
Diagnosis: Gout  Today we did:  Uric acid level -pending will result tomorrow   See via mychart/phone call     Plan:   Start  indomethacin   Rest painful joints.   When sitting or lying down, raise the painful joint to a level higher than your heart.  Apply an ice pack   Avoid alcohol and foods that cause gout, Drink extra fluid to help flush the uric acid through your kidneys.    Long-term:   Lifestyle changes:   including weight loss, exercise, and quitting tobacco use  Reducing consumption of the foods  high fructose corn syrup, found in many foods including sodas and energy drinks  Medicines to reduce the amount of uric acid in the blood,   such as allopurinol, probencid, febuxostat, and lesinurad.  Monitor for:   Fever over 101 F   Increasing redness around the joint  Pain developing in another joint  Repeated vomiting, abdominal pain, or blood in the vomit or stool (black or red color)      Gout          Gout is a disease that affects the joints.   - Left untreated, it can lead to painful foot and joint deformities and even kidney problems.   - But, by treating gout early, you can relieve pain and help prevent future problems.     Gout tends to come and go. A flare up of gout is called an attack.   During a gout attack, the affected joint may become hot, red, swollen, and painful. If you have had one attack of gout, you are likely to have another. An attack of gout can be treated with medicine. If these attacks become frequent, a daily medicine may be prescribed to help the kidneys remove uric acid from the body.     Treatment:   Gout can usually be treated with medicine and proper diet.   In severe cases, surgery may be needed.    causes of gout  Gout is caused by an excess of uric acid (a waste product made by the body).   Uric acid is excreted by the kidneys.   If the uric acid level in your blood rises too high, the uric acid may form crystals that collect in the joints, bringing on a gout attack.   If you  have many gout attacks, crystals may form large deposits called tophi. Tophi can damage joints and cause deformity.    Risk factors   Men are more likely to have gout than women.   But women can also be affected, mostly after menopause.   Some health problems, such as:   obesity and high cholesterol  some medicines, such as   diuretics ( water pills ), can trigger a gout attack.   People who drink a lot of alcohol are at high risk for gout.   Certain foods can also trigger a gout attack.  such as shellfish   or foods with additives such as high-fructose corn syrup) may increase uric acid levels in the blood and cause a gout attack.     Substances that may trigger a gout attack  To help prevent a gout attack, avoid these foods:  Alcohol (particularly beer, but also red wine and spirits)  Certain meats (red meat, processed meat, turkey)  Organ meats (kidney, liver, sweetbread)  Shellfish (lobster, crab, shrimp, scallop, mussel)  Certain fish (anchovy, sardine, herring, mackerel)    Preventing attacks  Limit or stop  alcohol use. Excess alcohol intake can cause a gout attack.  Limit these foods and beverages:  Organ meats, such as kidneys and liver  Certain seafoods (anchovies, sardines, shrimp, scallops, herring, mackerel)  Wild game, meat extracts and meat gravies  Foods and beverages sweetened with high-fructose corn syrup, such as sodas  Eat a healthy diet including low-fat and nonfat dairy, whole grains, and vegetables.  If you are overweight, talk to your healthcare provider about a weight reduction plan. Avoid fasting or extreme low calorie diets (less than 900 calories per day). This will increase uric acid levels in the body.  If you have diabetes or high blood pressure, work with your doctor to manage these conditions.  Protect the joint from injury. Wear good fitting socks and shoes. Injury can trigger a gout attack.

## 2025-03-12 NOTE — PROGRESS NOTES
URGENT CARE  Assessment & Plan   Assessment:   Arsenio Pavon is a 54 year old male who's clinical presentation today is consistent with:   1. Acute gout involving toe of right foot,   - Uric acid; Future  - indomethacin (INDOCIN) 50 MG capsule;    Plan:  Will treat patient's acute gout flare with indomethacin, as that is what has worked for him in the past, side effects of medications reviewed. Uric acid level pending, will come back tomorrow   Discussed w/ patient that he should follow up with his pcp regarding long-term prevention of gout flares to prevent recurrent attacks and chronic joint destruction. Educated him on additional long-term management strategies such as: dietary modifications, weight loss and limiting intake of alcohol.   Additionally we discussed if symptoms do not improve after starting today's treatment to follow up in 5-7 days, sooner if symptoms worsen, return precautions given    No alarm signs or symptoms present   Differential Diagnoses for this patient's chief complaint that I considered include:  Pseudogout, arthritis, septic arthritis, Trauma/injury, osteoarthritis, rheumatoid arthritis, Cellulitis      [facility]      ______________________________________________________________________      Subjective     HPI: Arsenio Pavon  is a 54 year old  male who presents today for evaluation the following concerns:   Patient presents with pain in their right foot/toes and endorses some mild redness and swelling, started yesterday.  Patient endorses toes are red and warm to touch and has Increased pain with palpation  Patient states a decreased range of motion, but denies any numbness or tingling   Patient endorses  having a history of gout in the past and states this is an acute on chronic condition, denies any accident or injury that may have caused his pain    Review of Systems:  Pertinent review of systems as reflected in HPI, otherwise  negative.     Objective    Physical Exam:  Vitals:    03/12/25 1330   BP: 127/83   Pulse: 82   Resp: 16   Temp: 97.8  F (36.6  C)   TempSrc: Oral   SpO2: 99%   Weight: 72.3 kg (159 lb 6.4 oz)      General:  alert and oriented, no acute distress, non ill-appearing   Vital signs reviewed: afebrile and normotensive   SKIN/MSK:   Right foot, toes 1-3:   noted auricular edematous and mildly erythematous joint inflammation    Increased pain to palpation, slight increased warmth    Pain with ambulation and slight limping noted   ______________________________________________________________________    I explained my diagnostic considerations and recommendations to the patient  All questions were answered.   Please see AVS for any patient instructions & handouts given.

## 2025-03-13 LAB — URATE SERPL-MCNC: 8.5 MG/DL (ref 3.4–7)

## 2025-05-05 ENCOUNTER — MYC REFILL (OUTPATIENT)
Dept: INTERNAL MEDICINE | Facility: CLINIC | Age: 55
End: 2025-05-05

## 2025-05-05 DIAGNOSIS — I10 ESSENTIAL HYPERTENSION: ICD-10-CM

## 2025-05-05 RX ORDER — LISINOPRIL 10 MG/1
10 TABLET ORAL DAILY
Qty: 90 TABLET | Refills: 0 | OUTPATIENT
Start: 2025-05-05

## 2025-05-27 ENCOUNTER — MYC REFILL (OUTPATIENT)
Dept: INTERNAL MEDICINE | Facility: CLINIC | Age: 55
End: 2025-05-27
Payer: COMMERCIAL

## 2025-05-27 DIAGNOSIS — I10 ESSENTIAL HYPERTENSION: ICD-10-CM

## 2025-05-27 RX ORDER — LISINOPRIL 10 MG/1
10 TABLET ORAL DAILY
Qty: 90 TABLET | Refills: 0 | OUTPATIENT
Start: 2025-05-27

## 2025-07-28 ENCOUNTER — OFFICE VISIT (OUTPATIENT)
Dept: INTERNAL MEDICINE | Facility: CLINIC | Age: 55
End: 2025-07-28
Payer: COMMERCIAL

## 2025-07-28 VITALS
RESPIRATION RATE: 16 BRPM | BODY MASS INDEX: 27.03 KG/M2 | DIASTOLIC BLOOD PRESSURE: 84 MMHG | SYSTOLIC BLOOD PRESSURE: 128 MMHG | WEIGHT: 157.5 LBS | HEART RATE: 71 BPM | OXYGEN SATURATION: 97 % | TEMPERATURE: 97.1 F

## 2025-07-28 DIAGNOSIS — M10.9 GOUTY ARTHRITIS: ICD-10-CM

## 2025-07-28 DIAGNOSIS — R73.01 IMPAIRED FASTING GLUCOSE: ICD-10-CM

## 2025-07-28 DIAGNOSIS — F17.200 TOBACCO USE DISORDER: ICD-10-CM

## 2025-07-28 DIAGNOSIS — E78.5 HYPERLIPIDEMIA LDL GOAL <100: ICD-10-CM

## 2025-07-28 DIAGNOSIS — I10 ESSENTIAL HYPERTENSION: Primary | ICD-10-CM

## 2025-07-28 DIAGNOSIS — I63.50 CEREBROVASCULAR ACCIDENT (CVA) DUE TO STENOSIS OF CEREBRAL ARTERY (H): ICD-10-CM

## 2025-07-28 LAB
ANION GAP SERPL CALCULATED.3IONS-SCNC: 10 MMOL/L (ref 7–15)
BUN SERPL-MCNC: 22.8 MG/DL (ref 6–20)
CALCIUM SERPL-MCNC: 9.8 MG/DL (ref 8.8–10.4)
CHLORIDE SERPL-SCNC: 105 MMOL/L (ref 98–107)
CREAT SERPL-MCNC: 1.03 MG/DL (ref 0.67–1.17)
EGFRCR SERPLBLD CKD-EPI 2021: 86 ML/MIN/1.73M2
EST. AVERAGE GLUCOSE BLD GHB EST-MCNC: 128 MG/DL
GLUCOSE SERPL-MCNC: 106 MG/DL (ref 70–99)
HBA1C MFR BLD: 6.1 % (ref 0–5.6)
HCO3 SERPL-SCNC: 26 MMOL/L (ref 22–29)
POTASSIUM SERPL-SCNC: 4.5 MMOL/L (ref 3.4–5.3)
SODIUM SERPL-SCNC: 141 MMOL/L (ref 135–145)

## 2025-07-28 PROCEDURE — 83036 HEMOGLOBIN GLYCOSYLATED A1C: CPT | Performed by: NURSE PRACTITIONER

## 2025-07-28 PROCEDURE — 3074F SYST BP LT 130 MM HG: CPT | Performed by: NURSE PRACTITIONER

## 2025-07-28 PROCEDURE — G2211 COMPLEX E/M VISIT ADD ON: HCPCS | Performed by: NURSE PRACTITIONER

## 2025-07-28 PROCEDURE — 36415 COLL VENOUS BLD VENIPUNCTURE: CPT | Performed by: NURSE PRACTITIONER

## 2025-07-28 PROCEDURE — 99214 OFFICE O/P EST MOD 30 MIN: CPT | Performed by: NURSE PRACTITIONER

## 2025-07-28 PROCEDURE — 3044F HG A1C LEVEL LT 7.0%: CPT | Performed by: NURSE PRACTITIONER

## 2025-07-28 PROCEDURE — 3079F DIAST BP 80-89 MM HG: CPT | Performed by: NURSE PRACTITIONER

## 2025-07-28 PROCEDURE — 80048 BASIC METABOLIC PNL TOTAL CA: CPT | Performed by: NURSE PRACTITIONER

## 2025-07-28 RX ORDER — ATORVASTATIN CALCIUM 40 MG/1
40 TABLET, FILM COATED ORAL DAILY
Qty: 90 TABLET | Refills: 3 | Status: SHIPPED | OUTPATIENT
Start: 2025-07-28

## 2025-07-28 RX ORDER — LISINOPRIL 10 MG/1
10 TABLET ORAL DAILY
Qty: 90 TABLET | Refills: 3 | Status: SHIPPED | OUTPATIENT
Start: 2025-07-28

## 2025-07-28 RX ORDER — ASPIRIN 81 MG/1
81 TABLET ORAL DAILY
COMMUNITY

## 2025-07-28 NOTE — PROGRESS NOTES
"  Assessment & Plan     (I10) Essential hypertension  (primary encounter diagnosis)  Comment: At goal in clinic today, but he has not had any caffeine yet.  Home trend tends to be higher.  He has been out of medication for couple months.  Refill provided.  Labs ordered for monitoring.  Plan: lisinopril (ZESTRIL) 10 MG tablet, Basic         metabolic panel    (M10.9) Gouty arthritis  Comment: Previous flare is now entirely resolved.  Previously discussed uric acid lowering therapy; he declines this today.    (F17.200) Tobacco use disorder  Comment: Still smoking 1/4 ppd cigarettes.  Elevated stress makes it difficult to quit smoking.  Declines intervention today.    (E78.5) Hyperlipidemia LDL goal <100  (I63.50) Cerebrovascular accident (CVA) due to stenosis of cerebral artery (H)  Comment: Medication refilled today.  He takes OTC baby aspirin daily as well.  Plan: atorvastatin (LIPITOR) 40 MG tablet    (R73.01) Impaired fasting glucose  Comment: Lab check today (previously ordered).     BMI  Estimated body mass index is 27.03 kg/m  as calculated from the following:    Height as of 1/20/25: 1.626 m (5' 4\").    Weight as of this encounter: 71.4 kg (157 lb 8 oz).   Weight management plan: Discussed healthy diet and exercise guidelines    Follow-up    Follow-up Visit   Expected date:  Jan 28, 2026 (Approximate)      Follow Up Appointment Details:     Follow-up with whom?: Me    Follow-Up for what?: Adult Preventive    Any Additional Chronic Condition Management?: Hypertension    How?: In Person             The longitudinal plan of care for the diagnosis(es)/condition(s) as documented were addressed during this visit. Due to the added complexity in care, I will continue to support Arsenio in the subsequent management and with ongoing continuity of care.        Subjective   Arsenio is a 54 year old, presenting for the following health issues:  Hypertension and Lipids    History of Present Illness       Diabetes:   He presents " for follow up of diabetes.    He is not checking blood glucose.         He has no concerns regarding his diabetes at this time.   He is not experiencing numbness or burning in feet, excessive thirst, blurry vision, weight changes or redness, sores or blisters on feet.           Hyperlipidemia:  He presents for follow up of hyperlipidemia.   He is taking medication to lower cholesterol. He is not having myalgia or other side effects to statin medications.    Hypertension: He presents for follow up of hypertension.  He does check blood pressure  regularly outside of the clinic. Outside blood pressures have been over 140/90. He does not follow a low salt diet.     He eats 0-1 servings of fruits and vegetables daily.He consumes 0 sweetened beverage(s) daily.He exercises with enough effort to increase his heart rate 9 or less minutes per day.  He is missing 2 dose(s) of medications per week.  He is not taking prescribed medications regularly due to other.      Presents to discuss the following concerns:  HTN:  Patient has been taking: lisinopril 10 mg daily  Missed doses: has been out of medication for a few months  Denies adverse medication effects.  Home blood pressure readings: At goal in clinic today; but he did not drink coffee this morning. Checks occasionally at home; typically 140s/100s.  Weight: has decreased 5 pounds over last 6 months due to walking more/working more frequently as a  vs   Vision changes: No change. Last exam was about 1 year ago.  urine microalbumin: normal 1/20/2025    Dietary interventions: regular   Eats most meals at the restaurant. [employer] and noodles every day.    Exercise interventions:  Walking frequently throughout the day, most days; works as a  and a .    Denies headache, chest pain or pressure, palpitations, decreased activity tolerance, shortness of breath, headache, vision changes, or lower extremity edema    LABS:  Recent Labs   Lab Test 01/20/25  1202  01/20/25  1156 11/06/23  1019 09/19/22  1011 04/23/19  1202   A1C  --  6.1*  --   --  5.8*   CHOL  --  162 170   < >  --    HDL  --  40 41   < >  --    LDL  --  70 94   < >  --    TRIG  --  260* 175*   < >  --    MICROL <12.0  --   --   --   --     < > = values in this interval not displayed.       Gout: Had a gout flare since LOV. Treated with indomethicin and an herbal beverage, with full symptom resolution. He declines uric acid lowering therapy today, noting that he typically only has 1 flare/year.   Smoking cessation: Still smoking about 1/4 ppd. Stress is elevated and makes it difficult to quit right now. He has been successful with quitting 3-4x previously. Declines intervention today.      Review of Systems  Constitutional, HEENT, cardiovascular, pulmonary, GI, , musculoskeletal, neuro, skin, endocrine and psych systems are negative, except as otherwise noted.      Objective    /84   Pulse 71   Temp 97.1  F (36.2  C) (Temporal)   Resp 16   Wt 71.4 kg (157 lb 8 oz)   SpO2 97%   BMI 27.03 kg/m    Body mass index is 27.03 kg/m .  Physical Exam  Vitals and nursing note reviewed.   Constitutional:       General: He is not in acute distress.     Appearance: Normal appearance.   Cardiovascular:      Rate and Rhythm: Normal rate and regular rhythm.      Pulses: Normal pulses.      Heart sounds: Normal heart sounds. No murmur heard.     No friction rub. No gallop.   Pulmonary:      Effort: Pulmonary effort is normal. No respiratory distress.      Breath sounds: Normal breath sounds. No wheezing, rhonchi or rales.   Musculoskeletal:         General: No swelling.   Skin:     General: Skin is warm and dry.   Neurological:      General: No focal deficit present.      Mental Status: He is alert and oriented to person, place, and time.   Psychiatric:         Mood and Affect: Mood normal.         Behavior: Behavior normal.         Thought Content: Thought content normal.         Judgment: Judgment normal.             Signed Electronically by: LIT Carnes CNP

## 2025-08-04 ENCOUNTER — OFFICE VISIT (OUTPATIENT)
Dept: DERMATOLOGY | Facility: CLINIC | Age: 55
End: 2025-08-04
Attending: NURSE PRACTITIONER
Payer: COMMERCIAL

## 2025-08-04 DIAGNOSIS — L60.8 LONGITUDINAL MELANONYCHIA: ICD-10-CM

## 2025-08-04 PROCEDURE — 99203 OFFICE O/P NEW LOW 30 MIN: CPT | Performed by: STUDENT IN AN ORGANIZED HEALTH CARE EDUCATION/TRAINING PROGRAM

## 2025-08-04 PROCEDURE — 1126F AMNT PAIN NOTED NONE PRSNT: CPT | Performed by: STUDENT IN AN ORGANIZED HEALTH CARE EDUCATION/TRAINING PROGRAM

## 2025-08-04 ASSESSMENT — PAIN SCALES - GENERAL: PAINLEVEL_OUTOF10: NO PAIN (0)
